# Patient Record
Sex: MALE | Race: WHITE | NOT HISPANIC OR LATINO | Employment: FULL TIME | URBAN - NONMETROPOLITAN AREA
[De-identification: names, ages, dates, MRNs, and addresses within clinical notes are randomized per-mention and may not be internally consistent; named-entity substitution may affect disease eponyms.]

---

## 2017-01-20 ENCOUNTER — TELEPHONE (OUTPATIENT)
Dept: FAMILY MEDICINE CLINIC | Facility: CLINIC | Age: 27
End: 2017-01-20

## 2017-01-20 DIAGNOSIS — F31.9 BIPOLAR 1 DISORDER, DEPRESSED (HCC): ICD-10-CM

## 2017-01-20 RX ORDER — GABAPENTIN 100 MG/1
100 CAPSULE ORAL 3 TIMES DAILY
Qty: 270 CAPSULE | Refills: 1 | Status: SHIPPED | OUTPATIENT
Start: 2017-01-20 | End: 2017-01-31 | Stop reason: SDUPTHER

## 2017-01-20 NOTE — TELEPHONE ENCOUNTER
----- Message from Ida Garcias sent at 1/19/2017 11:30 AM CST -----  Regarding: SCRIPT REQUEST  Contact: [phone]  CHANCE NOONAN/[facility] [place] REQUESTING SCRIPT FOR GABAPENTIN. INSURANCE IS REQUESTING A 90 DAY SUPPLY

## 2017-01-25 DIAGNOSIS — F31.9 BIPOLAR 1 DISORDER, DEPRESSED (HCC): ICD-10-CM

## 2017-01-26 ENCOUNTER — OFFICE VISIT (OUTPATIENT)
Dept: FAMILY MEDICINE CLINIC | Facility: CLINIC | Age: 27
End: 2017-01-26

## 2017-01-26 ENCOUNTER — TELEPHONE (OUTPATIENT)
Dept: FAMILY MEDICINE CLINIC | Facility: CLINIC | Age: 27
End: 2017-01-26

## 2017-01-26 VITALS
BODY MASS INDEX: 34.4 KG/M2 | DIASTOLIC BLOOD PRESSURE: 66 MMHG | HEART RATE: 114 BPM | HEIGHT: 71 IN | OXYGEN SATURATION: 98 % | WEIGHT: 245.7 LBS | SYSTOLIC BLOOD PRESSURE: 136 MMHG

## 2017-01-26 DIAGNOSIS — F31.76 BIPOLAR DISORDER, IN FULL REMISSION, MOST RECENT EPISODE DEPRESSED (HCC): Primary | ICD-10-CM

## 2017-01-26 PROCEDURE — 99213 OFFICE O/P EST LOW 20 MIN: CPT | Performed by: FAMILY MEDICINE

## 2017-01-26 RX ORDER — OLANZAPINE 15 MG/1
15 TABLET ORAL
COMMUNITY
Start: 2016-10-18 | End: 2017-06-26

## 2017-01-26 RX ORDER — GABAPENTIN 100 MG/1
CAPSULE ORAL
COMMUNITY
Start: 2016-10-29 | End: 2017-06-26

## 2017-01-26 NOTE — TELEPHONE ENCOUNTER
----- Message from Teressa Rankin sent at 1/26/2017 12:28 PM CST -----  Contact: PT IS AT OFFICE  PT IS HAVING A NEGATIVE REACTION TO MEDS AND WOULD LIKE TO KNOW IF HE CAN SEE YOU TODAY?    Patient has an appointment with Dr. marina this afternoon.          This document has been electronically signed by Aparna Knox MD on January 26, 2017 1:16 PM

## 2017-01-26 NOTE — PROGRESS NOTES
I have reviewed the notes, assessments, and/or procedures performed. I concur with her/his documentation of Ravi Reid.     Edgard Perkins, DO

## 2017-01-26 NOTE — PROGRESS NOTES
Subjective:     Ravi Reid is a 26 y.o. male who presents for follow up for depression and bipolar disorder.     Preventative:  Over the past 2 weeks, have you felt down, depressed, or hopeless?Yes   Over the past 2 weeks, have you felt little interest or pleasure in doing things?Yes  Clinical depression screening refused by patient.No     On osteoporosis therapy?No     Past Medical Hx:  Past Medical History   Diagnosis Date   • Acquired hypothyroidism    • Allergic rhinitis    • Bilateral conjunctivitis    • BOM (bilateral otitis media)    • General medical examination      General examination of patient - college physical      • Hypertensive disorder    • Insect bite      Insect bite - wound - History of possible spider bite.      • Kidney stones, calcium oxalate    • Upper respiratory infection        Past Surgical Hx:  Past Surgical History   Procedure Laterality Date   • Cystoscopy  11/14/2014      Right retrograde, ureteroscpoy, laser lithotripsy and J stent placement.        Health Maintenance:  Health Maintenance   Topic Date Due   • INFLUENZA VACCINE  08/01/2016   • TDAP/TD VACCINES (3 - Td) 05/11/2020   • PNEUMOCOCCAL VACCINE (19-64 MEDIUM RISK)  Addressed       Current Meds:    Current Outpatient Prescriptions:   •  buPROPion XL (WELLBUTRIN XL) 150 MG 24 hr tablet, Take 1 tablet by mouth Daily., Disp: 90 tablet, Rfl: 0  •  gabapentin (NEURONTIN) 100 MG capsule, TAKE 1 CAPSULE BY MOUTH 3 (THREE) TIMES A DAY., Disp: 120 capsule, Rfl: 1  •  gabapentin (NEURONTIN) 100 MG capsule, Take 1 capsule by mouth 3 (Three) Times a Day for 90 days., Disp: 270 capsule, Rfl: 1  •  gabapentin (NEURONTIN) 100 MG capsule, TAKE 1 CAPSULE BY MOUTH 3 (THREE) TIMES A DAY., Disp: , Rfl:   •  OLANZapine (ZYPREXA) 15 MG tablet, Take 1 tablet by mouth Every Night., Disp: 90 tablet, Rfl: 1  •  OLANZapine (ZYPREXA) 15 MG tablet, Take 15 mg by mouth., Disp: , Rfl:     Allergies:  Penicillins    Family Hx:  No family  "history on file.     Social History:  Social History     Social History   • Marital status: Single     Spouse name: N/A   • Number of children: N/A   • Years of education: N/A     Occupational History   • Not on file.     Social History Main Topics   • Smoking status: Current Some Day Smoker     Types: Electronic Cigarette   • Smokeless tobacco: Never Used   • Alcohol use 2.4 oz/week     2 Cans of beer, 2 Shots of liquor per week   • Drug use: No   • Sexual activity: Yes     Birth control/ protection: Condom     Other Topics Concern   • Not on file     Social History Narrative       Review of Systems  General:negative for - chills, fatigue, fever, hot flashes, malaise, night sweats, weight gain or weight loss  Psychological: Positive for depression   Ophthalmic: negative for - blurry vision or loss of vision  ENT: negative for - hearing change, nasal congestion or sore throat  Hematological and Lymphatic: negative for - jaundice  Endocrine: negative for - hair pattern changes, skin changes or temperature intolerance  Respiratory: no cough, shortness of breath, or wheezing  Cardiovascular: no chest pain, edema or dyspnea on exertion  Gastrointestinal: no  Nausea/vomiting, abdominal pain, change in bowel habits, or black or bloody stools  Genito-Urinary: no dysuria, trouble voiding, or hematuria  Musculoskeletal: negative for - joint pain or muscle pain  Neurological: negative for - dizziness, headaches, numbness/tingling or seizures  Dermatological: negative for rash and skin lesion changes      Objective:     Visit Vitals   • /66 (BP Location: Left arm, Patient Position: Sitting, Cuff Size: Adult)   • Pulse 114   • Ht 71\" (180.3 cm)   • Wt 245 lb 11.2 oz (111 kg)   • SpO2 98%   • BMI 34.27 kg/m2           General Appearance:    Alert, cooperative, no distress, appears stated age   Head:    Normocephalic, without obvious abnormality, atraumatic   Eyes:    PERRL, conjunctiva/corneas clear, EOM's intact   Ears:   "  Normal  external ear canals, both ears   Nose:   Nares normal, septum midline, mucosa normal, no drainage       Throat:   Lips, mucosa, and tongue normal; teeth and gums normal   Neck:   Supple, symmetrical, trachea midline   Back:     Symmetric, no curvature, ROM normal   Lungs:     Clear to auscultation bilaterally, respirations unlabored   Chest Wall:    No tenderness or deformity    Heart:    Regular rate and rhythm, S1 and S2 normal, no murmur, rub    or gallop   Abdomen:     Soft, non-tender, bowel sounds active all four quadrants,       Extremities:   Extremities normal, atraumatic, no cyanosis or edema       Skin:   Skin color, texture, turgor normal, no rashes or lesions       Neurologic:   CNII-XII grossly intact              Assessment/Plan:     1. Bipolar disorder, in full remission, most recent episode depressed         I have told patient to reduce dose of Zyprexa from 15 mg daily to 7.5 mg daily; if he has any thoughts of harming or killing himself, he needs to go to ER. Pt voices understanding.   Follow-up:     No Follow-up on file.              This document has been electronically signed by Stu Thomas MD on January 26, 2017 3:13 PM

## 2017-01-26 NOTE — MR AVS SNAPSHOT
Ravi Reid   1/26/2017 2:45 PM   Office Visit    Dept Phone:  [phone]   Encounter #:  [id number]    Provider:  Stu Thomas MD   Department:  [facility] FAMILY MEDICINE                Your Full Care Plan              Your Updated Medication List          This list is accurate as of: 1/26/17  3:07 PM.  Always use your most recent med list.                buPROPion  MG 24 hr tablet   Commonly known as:  WELLBUTRIN XL   Take 1 tablet by mouth Daily.       * gabapentin 100 MG capsule   Commonly known as:  NEURONTIN       * gabapentin 100 MG capsule   Commonly known as:  NEURONTIN   TAKE 1 CAPSULE BY MOUTH 3 (THREE) TIMES A DAY.       * gabapentin 100 MG capsule   Commonly known as:  NEURONTIN   Take 1 capsule by mouth 3 (Three) Times a Day for 90 days.       * ZYPREXA 15 MG tablet   Generic drug:  OLANZapine       * OLANZapine 15 MG tablet   Commonly known as:  ZYPREXA   Take 1 tablet by mouth Every Night.       * Notice:  This list has 5 medication(s) that are the same as other medications prescribed for you. Read the directions carefully, and ask your doctor or other care provider to review them with you.            Instructions     None    Patient Instructions History      Upcoming Appointments     Visit Type Date Time Department    OFFICE VISIT 1/26/2017  2:45 PM [facility] RESIDENT [facility]    OFFICE VISIT 1/31/2017  3:45 PM [facility] RESIDENT [facility] Signup     Our records indicate that you have an active [employer]" account.    You can view your After Visit Summary by going to [employer] and logging in with your [employer] username and password.  If you don't have a [employer] username and password but a parent or guardian has access to your record, the parent or guardian should login with their own [employer] username and password and access your record to view the After Visit Summary.    If you have questions, you can email  [email or web address] or call [phone] to talk to our MyChart staff.  Remember, MyChart is NOT to be used for urgent needs.  For medical emergencies, dial 911.               Other Info from Your Visit           Your Appointments     Jan 31, 2017  3:45 PM CST   Office Visit with Aparna Knox MD   [facility] FAMILY MEDICINE (--)    [address] [place] [address] [phone]           Arrive 15 minutes prior to appointment.              Other Notes About Your Plan     Risk score 4        Allergies     Penicillins  Rash      Reason for Visit     Anxiety     Depression           Vital Signs     Blood Pressure Pulse Height Weight Oxygen Saturation Body Mass Index    136/66 (BP Location: Left arm, Patient Position: Sitting, Cuff Size: Adult) 114 71\" (180.3 cm) 245 lb 11.2 oz (111 kg) 98% 34.27 kg/m2    Smoking Status                   Current Some Day Smoker             "

## 2017-01-27 RX ORDER — GABAPENTIN 100 MG/1
CAPSULE ORAL
Qty: 120 CAPSULE | Refills: 0 | OUTPATIENT
Start: 2017-01-27

## 2017-01-31 ENCOUNTER — OFFICE VISIT (OUTPATIENT)
Dept: FAMILY MEDICINE CLINIC | Facility: CLINIC | Age: 27
End: 2017-01-31

## 2017-01-31 VITALS
WEIGHT: 251.6 LBS | OXYGEN SATURATION: 99 % | BODY MASS INDEX: 35.22 KG/M2 | DIASTOLIC BLOOD PRESSURE: 88 MMHG | HEART RATE: 109 BPM | HEIGHT: 71 IN | SYSTOLIC BLOOD PRESSURE: 140 MMHG

## 2017-01-31 DIAGNOSIS — F31.32 BIPOLAR 1 DISORDER, DEPRESSED, MODERATE (HCC): Primary | ICD-10-CM

## 2017-01-31 PROCEDURE — 99213 OFFICE O/P EST LOW 20 MIN: CPT | Performed by: FAMILY MEDICINE

## 2017-01-31 RX ORDER — LURASIDONE HYDROCHLORIDE 20 MG/1
20 TABLET, FILM COATED ORAL DAILY
Qty: 30 TABLET | Refills: 3 | Status: SHIPPED | OUTPATIENT
Start: 2017-01-31 | End: 2017-03-28 | Stop reason: SDUPTHER

## 2017-03-28 ENCOUNTER — TELEPHONE (OUTPATIENT)
Dept: FAMILY MEDICINE CLINIC | Facility: CLINIC | Age: 27
End: 2017-03-28

## 2017-03-28 DIAGNOSIS — F31.32 BIPOLAR 1 DISORDER, DEPRESSED, MODERATE (HCC): ICD-10-CM

## 2017-03-28 RX ORDER — LURASIDONE HYDROCHLORIDE 20 MG/1
20 TABLET, FILM COATED ORAL DAILY
Qty: 90 TABLET | Refills: 0 | Status: SHIPPED | OUTPATIENT
Start: 2017-03-28 | End: 2017-06-26 | Stop reason: SDUPTHER

## 2017-03-28 NOTE — TELEPHONE ENCOUNTER
Refill for latuda was sent in to [facility] pharmacy.    Aparna Knox M.D.  Family Medicine Resident, PGY II        This document has been electronically signed by Aparna Knox MD on March 28, 2017 10:22 AM

## 2017-06-26 ENCOUNTER — OFFICE VISIT (OUTPATIENT)
Dept: FAMILY MEDICINE CLINIC | Facility: CLINIC | Age: 27
End: 2017-06-26

## 2017-06-26 ENCOUNTER — APPOINTMENT (OUTPATIENT)
Dept: LAB | Facility: HOSPITAL | Age: 27
End: 2017-06-26

## 2017-06-26 VITALS
DIASTOLIC BLOOD PRESSURE: 84 MMHG | BODY MASS INDEX: 35.16 KG/M2 | HEART RATE: 76 BPM | HEIGHT: 71 IN | SYSTOLIC BLOOD PRESSURE: 136 MMHG | WEIGHT: 251.13 LBS | OXYGEN SATURATION: 98 %

## 2017-06-26 DIAGNOSIS — E66.09 OBESITY DUE TO EXCESS CALORIES, UNSPECIFIED OBESITY SEVERITY: ICD-10-CM

## 2017-06-26 DIAGNOSIS — Z13.1 SCREENING FOR DIABETES MELLITUS: ICD-10-CM

## 2017-06-26 DIAGNOSIS — F31.32 BIPOLAR 1 DISORDER, DEPRESSED, MODERATE (HCC): Primary | ICD-10-CM

## 2017-06-26 LAB
ALBUMIN SERPL-MCNC: 4.8 G/DL (ref 3.4–4.8)
ALBUMIN/GLOB SERPL: 1.6 G/DL (ref 1.1–1.8)
ALP SERPL-CCNC: 78 U/L (ref 38–126)
ALT SERPL W P-5'-P-CCNC: 85 U/L (ref 21–72)
ANION GAP SERPL CALCULATED.3IONS-SCNC: 13 MMOL/L (ref 5–15)
AST SERPL-CCNC: 53 U/L (ref 17–59)
BASOPHILS # BLD AUTO: 0.06 10*3/MM3 (ref 0–0.2)
BASOPHILS NFR BLD AUTO: 0.6 % (ref 0–2)
BILIRUB SERPL-MCNC: 0.8 MG/DL (ref 0.2–1.3)
BUN BLD-MCNC: 11 MG/DL (ref 7–21)
BUN/CREAT SERPL: 11.1 (ref 7–25)
CALCIUM SPEC-SCNC: 9 MG/DL (ref 8.4–10.2)
CHLORIDE SERPL-SCNC: 101 MMOL/L (ref 95–110)
CO2 SERPL-SCNC: 26 MMOL/L (ref 22–31)
CREAT BLD-MCNC: 0.99 MG/DL (ref 0.7–1.3)
DEPRECATED RDW RBC AUTO: 36.5 FL (ref 35.1–43.9)
EOSINOPHIL # BLD AUTO: 0.5 10*3/MM3 (ref 0–0.7)
EOSINOPHIL NFR BLD AUTO: 5.1 % (ref 0–7)
ERYTHROCYTE [DISTWIDTH] IN BLOOD BY AUTOMATED COUNT: 12.2 % (ref 11.5–14.5)
GFR SERPL CREATININE-BSD FRML MDRD: 92 ML/MIN/1.73 (ref 77–179)
GLOBULIN UR ELPH-MCNC: 3 GM/DL (ref 2.3–3.5)
GLUCOSE BLD-MCNC: 89 MG/DL (ref 60–100)
HBA1C MFR BLD: 5.47 % (ref 4–5.6)
HCT VFR BLD AUTO: 44.4 % (ref 39–49)
HGB BLD-MCNC: 15.6 G/DL (ref 13.7–17.3)
IMM GRANULOCYTES # BLD: 0.02 10*3/MM3 (ref 0–0.02)
IMM GRANULOCYTES NFR BLD: 0.2 % (ref 0–0.5)
LYMPHOCYTES # BLD AUTO: 2.92 10*3/MM3 (ref 0.6–4.2)
LYMPHOCYTES NFR BLD AUTO: 29.9 % (ref 10–50)
MCH RBC QN AUTO: 29.2 PG (ref 26.5–34)
MCHC RBC AUTO-ENTMCNC: 35.1 G/DL (ref 31.5–36.3)
MCV RBC AUTO: 83 FL (ref 80–98)
MONOCYTES # BLD AUTO: 0.8 10*3/MM3 (ref 0–0.9)
MONOCYTES NFR BLD AUTO: 8.2 % (ref 0–12)
NEUTROPHILS # BLD AUTO: 5.46 10*3/MM3 (ref 2–8.6)
NEUTROPHILS NFR BLD AUTO: 56 % (ref 37–80)
PLATELET # BLD AUTO: 260 10*3/MM3 (ref 150–450)
PMV BLD AUTO: 10.6 FL (ref 8–12)
POTASSIUM BLD-SCNC: 4.1 MMOL/L (ref 3.5–5.1)
PROT SERPL-MCNC: 7.8 G/DL (ref 6.3–8.6)
RBC # BLD AUTO: 5.35 10*6/MM3 (ref 4.37–5.74)
SODIUM BLD-SCNC: 140 MMOL/L (ref 137–145)
TSH SERPL DL<=0.05 MIU/L-ACNC: 7.32 MIU/ML (ref 0.46–4.68)
WBC NRBC COR # BLD: 9.76 10*3/MM3 (ref 3.2–9.8)

## 2017-06-26 PROCEDURE — 83525 ASSAY OF INSULIN: CPT | Performed by: FAMILY MEDICINE

## 2017-06-26 PROCEDURE — 80053 COMPREHEN METABOLIC PANEL: CPT | Performed by: FAMILY MEDICINE

## 2017-06-26 PROCEDURE — 84443 ASSAY THYROID STIM HORMONE: CPT | Performed by: FAMILY MEDICINE

## 2017-06-26 PROCEDURE — 84439 ASSAY OF FREE THYROXINE: CPT | Performed by: FAMILY MEDICINE

## 2017-06-26 PROCEDURE — 85025 COMPLETE CBC W/AUTO DIFF WBC: CPT | Performed by: FAMILY MEDICINE

## 2017-06-26 PROCEDURE — 83036 HEMOGLOBIN GLYCOSYLATED A1C: CPT | Performed by: FAMILY MEDICINE

## 2017-06-26 PROCEDURE — 99213 OFFICE O/P EST LOW 20 MIN: CPT | Performed by: FAMILY MEDICINE

## 2017-06-26 PROCEDURE — 36415 COLL VENOUS BLD VENIPUNCTURE: CPT | Performed by: FAMILY MEDICINE

## 2017-06-26 RX ORDER — GABAPENTIN 300 MG/1
300 CAPSULE ORAL 3 TIMES DAILY
Qty: 90 CAPSULE | Refills: 5 | Status: SHIPPED | OUTPATIENT
Start: 2017-06-26 | End: 2017-12-22

## 2017-06-26 RX ORDER — LURASIDONE HYDROCHLORIDE 20 MG/1
20 TABLET, FILM COATED ORAL DAILY
Qty: 30 TABLET | Refills: 5 | Status: SHIPPED | OUTPATIENT
Start: 2017-06-26 | End: 2017-12-21 | Stop reason: SDUPTHER

## 2017-06-27 LAB — T4 FREE SERPL-MCNC: 1.11 NG/DL (ref 0.78–2.19)

## 2017-06-29 LAB — INSULIN SERPL-ACNC: 6.4 UIU/ML

## 2017-06-30 ENCOUNTER — TELEPHONE (OUTPATIENT)
Dept: FAMILY MEDICINE CLINIC | Facility: CLINIC | Age: 27
End: 2017-06-30

## 2017-07-03 NOTE — TELEPHONE ENCOUNTER
Spoke with Insurance company. They stated it was a deductible issue. Latuda is covered, however, patient hasn't met his deductible. They stated he may call them directly for an appeal. Called patient and left message.     Signature   Aparna Knox M.D.  Family Medicine Resident, PGY II  [address] [place], [place] [id number] [phone]          This document has been electronically signed by Aparna Knox MD on July 3, 2017 9:12 AM

## 2017-07-05 ENCOUNTER — TELEPHONE (OUTPATIENT)
Dept: FAMILY MEDICINE CLINIC | Facility: CLINIC | Age: 27
End: 2017-07-05

## 2017-07-06 ENCOUNTER — TELEPHONE (OUTPATIENT)
Dept: FAMILY MEDICINE CLINIC | Facility: CLINIC | Age: 27
End: 2017-07-06

## 2017-07-06 NOTE — TELEPHONE ENCOUNTER
Medication has been approved for today through 7/6/2019  Authorization: [id number]  Called [facility] pharmacy and patient to inform them of the approval and to fill medication    Signature   Aparna Knox M.D.  Family Medicine Resident, PGY III  [address] [place], [place] [id number] [phone]          This document has been electronically signed by Aparna Knox MD on July 6, 2017 5:22 PM

## 2017-07-17 ENCOUNTER — TELEPHONE (OUTPATIENT)
Dept: FAMILY MEDICINE CLINIC | Facility: CLINIC | Age: 27
End: 2017-07-17

## 2017-07-17 DIAGNOSIS — E03.9 ACQUIRED HYPOTHYROIDISM: Primary | ICD-10-CM

## 2017-07-17 RX ORDER — LEVOTHYROXINE SODIUM 0.03 MG/1
25 TABLET ORAL DAILY
Qty: 30 TABLET | Refills: 2 | Status: SHIPPED | OUTPATIENT
Start: 2017-07-17 | End: 2019-02-13

## 2017-07-17 NOTE — TELEPHONE ENCOUNTER
----- Message from Gabrielle Santa sent at 7/17/2017 11:22 AM CDT -----  PT CALLED, SAID THAT THERE WAS SUPPOSED TO BE A MED CALLED OVER TO HIS PHARMACY.    SAID THAT IT WAS SUPPOSED TO BE SYNTHROID.    PT USES [facility].     CAN REACH PT [phone]

## 2017-07-17 NOTE — TELEPHONE ENCOUNTER
Discussed with patient prior. Patient needs a low dose synthroid. Will continue to monitor. Discussed side effects and proper ways to take medication (30 min prior to eating or drinking anything other then water). Patient understands.     Signature   Aparna Knox M.D.  Family Medicine Resident, PGY III  [address] [place], [place] [id number] [phone]          This document has been electronically signed by Aparna Knox MD on July 17, 2017 1:25 PM

## 2017-07-21 DIAGNOSIS — F31.9 BIPOLAR 1 DISORDER, DEPRESSED (HCC): ICD-10-CM

## 2017-08-10 NOTE — TELEPHONE ENCOUNTER
CALLED THIS PT AND ADVISED HIM HE NEEDS AN APPT TO GET THIS SCRIPT FILLED, HE SAID HE'D CHECK HIS WORK SCHEDULE AND CALL US BACK.

## 2017-08-11 RX ORDER — GABAPENTIN 100 MG/1
CAPSULE ORAL
Qty: 270 CAPSULE | OUTPATIENT
Start: 2017-08-11

## 2017-08-11 NOTE — TELEPHONE ENCOUNTER
Spoke with the patient at last visit about gabapentin becoming a controlled substance. He understood. [facility]     Signature   Aparna Knox M.D.  Family Medicine Resident, PGY III  [address] [place], [place] [id number] [phone]          This document has been electronically signed by Aparna Knox MD on August 11, 2017 8:50 AM

## 2017-12-21 DIAGNOSIS — F31.32 BIPOLAR 1 DISORDER, DEPRESSED, MODERATE (HCC): ICD-10-CM

## 2017-12-21 RX ORDER — LURASIDONE HYDROCHLORIDE 20 MG/1
TABLET, FILM COATED ORAL
Qty: 30 TABLET | Refills: 5 | Status: SHIPPED | OUTPATIENT
Start: 2017-12-21 | End: 2017-12-22

## 2017-12-22 ENCOUNTER — OFFICE VISIT (OUTPATIENT)
Dept: FAMILY MEDICINE CLINIC | Facility: CLINIC | Age: 27
End: 2017-12-22

## 2017-12-22 VITALS
HEIGHT: 71 IN | BODY MASS INDEX: 35.21 KG/M2 | OXYGEN SATURATION: 96 % | WEIGHT: 251.5 LBS | DIASTOLIC BLOOD PRESSURE: 70 MMHG | HEART RATE: 92 BPM | SYSTOLIC BLOOD PRESSURE: 136 MMHG

## 2017-12-22 DIAGNOSIS — G43.109 MIGRAINE WITH AURA AND WITHOUT STATUS MIGRAINOSUS, NOT INTRACTABLE: ICD-10-CM

## 2017-12-22 DIAGNOSIS — F31.9 BIPOLAR 1 DISORDER (HCC): Primary | ICD-10-CM

## 2017-12-22 DIAGNOSIS — F41.9 ANXIETY: ICD-10-CM

## 2017-12-22 PROCEDURE — 99213 OFFICE O/P EST LOW 20 MIN: CPT | Performed by: FAMILY MEDICINE

## 2017-12-22 RX ORDER — BUSPIRONE HYDROCHLORIDE 15 MG/1
15 TABLET ORAL 2 TIMES DAILY
Qty: 60 TABLET | Refills: 3 | Status: SHIPPED | OUTPATIENT
Start: 2017-12-22 | End: 2018-08-24 | Stop reason: HOSPADM

## 2017-12-22 RX ORDER — SUMATRIPTAN 50 MG/1
TABLET, FILM COATED ORAL
Qty: 10 TABLET | Refills: 3 | Status: SHIPPED | OUTPATIENT
Start: 2017-12-22 | End: 2019-05-14 | Stop reason: SDUPTHER

## 2017-12-22 RX ORDER — LURASIDONE HYDROCHLORIDE 40 MG/1
40 TABLET, FILM COATED ORAL DAILY
Qty: 30 TABLET | Refills: 5 | Status: SHIPPED | OUTPATIENT
Start: 2017-12-22 | End: 2018-08-24 | Stop reason: HOSPADM

## 2017-12-22 NOTE — PROGRESS NOTES
I have reviewed the notes, assessments, and/or procedures performed by Dr. Knox, I concur with her/his documentation of Ravi Reid.         This document has been electronically signed by Bertha Helms MD on December 22, 2017 10:55 AM

## 2017-12-22 NOTE — PROGRESS NOTES
Subjective:     Ravi Reid is a 27 y.o. male who presents for follow up for Bipolar type 1  Depression HPI:  Ravi Reid is a 27 y.o. male who presents for Bipolar 1 depression.      Depression:   Depression symptoms were gradual in onset. Onset was approximately 3 months ago. gradually worsening since that time.  Concerns/Stressors:Was improved on Latuda 20 mg. Now with time change and getting dark early, patient has been getting more depressed    Depression symptoms:    Feeling depressed, Anhedonia, Altered appetite, Sleep disturbance, Lack of energy and Feeling withdrawn  Alarm symptoms:   History of manic episode  Manic symptoms:   Periods of abnormally elevated mood, Increased activity, Decreased need for sleep, Rapid speech, Racing thoughts, Easily distracted, Periods of increased spending of risk in which patient sold his car and bought one he could not afford  Associated symptoms:   Feeling anxious and Feeling irritable  Comorbid Conditions:   Positive for Bipolar type 1    He has no current suicidal and homicidal plan or intent.    Treatments:  Patient did well with latuda 20 mg daily. He did not notice a difference with Gabapentin. Has tried SSRIs in the past that sent him     PHQ 9 SCORE: 18    Total Score Depression Severity   1-4 Minimal depression   5-9 Mild depression   10-14 Moderate depression   15-19 Moderately severe depression   20-27 Severe depression         Headache  Patient presents for evaluation of headache. Symptoms began about 3 months ago. Generally, the headaches last about 1 day and occur several times per week. The headaches ussually better with Time and excedrin. The headaches are usually throbbing and are located in the temples and up to the top of his head.  The patient rates his most severe headaches a 10 on a scale from 1 to 10. Recently, the headaches have been increasing in frequency. Work attendance or other daily activities are affected by the  headaches. Precipitating factors include: none which have been determined. The headaches are usually preceded by an aura consisting of blurry vision. Associated neurologic symptoms: speech difficulties and vision problems. The patient denies decreased physical activity, loss of balance, muscle weakness and vomiting in the early morning. Home treatment has included ibuprofen and excedrin with little improvement. Other history includes: nothing pertinent. Family history includes migraine headaches in mother.      Preventative:  Over the past 2 weeks, have you felt down, depressed, or hopeless?Yes   Over the past 2 weeks, have you felt little interest or pleasure in doing things?Yes  Clinical depression screening refused by patient.No     On osteoporosis therapy?Not Indicated     Past Medical Hx:  Past Medical History:   Diagnosis Date   • Acquired hypothyroidism    • Allergic rhinitis    • Bilateral conjunctivitis    • BOM (bilateral otitis media)    • General medical examination     General examination of patient - college physical      • Hypertensive disorder    • Insect bite     Insect bite - wound - History of possible spider bite.      • Kidney stones, calcium oxalate    • Upper respiratory infection        Past Surgical Hx:  Past Surgical History:   Procedure Laterality Date   • CYSTOSCOPY  11/14/2014     Right retrograde, ureteroscpoy, laser lithotripsy and J stent placement.        Health Maintenance:  Health Maintenance   Topic Date Due   • INFLUENZA VACCINE  08/01/2017   • TDAP/TD VACCINES (3 - Td) 05/11/2020   • PNEUMOCOCCAL VACCINE (19-64 MEDIUM RISK)  Addressed       Current Meds:    Current Outpatient Prescriptions:   •  levothyroxine (SYNTHROID, LEVOTHROID) 25 MCG tablet, Take 1 tablet by mouth Daily., Disp: 30 tablet, Rfl: 2  •  busPIRone (BUSPAR) 15 MG tablet, Take 1 tablet by mouth 2 (Two) Times a Day., Disp: 60 tablet, Rfl: 3  •  lurasidone (LATUDA) 40 MG tablet tablet, Take 1 tablet by mouth  "Daily., Disp: 30 tablet, Rfl: 5  •  SUMAtriptan (IMITREX) 50 MG tablet, Take one tablet at onset of headache. May repeat dose one time in 2 hours if headache not relieved., Disp: 10 tablet, Rfl: 3    Allergies:  Penicillins    Family Hx:  No family history on file.     Social History:  Social History     Social History   • Marital status: Single     Spouse name: N/A   • Number of children: N/A   • Years of education: N/A     Occupational History   • Not on file.     Social History Main Topics   • Smoking status: Current Some Day Smoker     Types: Electronic Cigarette   • Smokeless tobacco: Never Used   • Alcohol use 2.4 oz/week     2 Cans of beer, 2 Shots of liquor per week   • Drug use: No   • Sexual activity: Yes     Birth control/ protection: Condom     Other Topics Concern   • Not on file     Social History Narrative       Review of Systems  General:negative for - chills, fatigue, fever, hot flashes, malaise, night sweats, weight gain or weight loss  Psychological: negative for - anxiety, depression, sleep disturbances or suicidal ideation  Ophthalmic: negative for - blurry vision or loss of vision  ENT: negative for - hearing change, nasal congestion or sore throat  Hematological and Lymphatic: negative for - jaundice  Endocrine: negative for - hair pattern changes, skin changes or temperature intolerance  Respiratory: no cough, shortness of breath, or wheezing  Cardiovascular: no chest pain, edema or dyspnea on exertion  Gastrointestinal: no  Nausea/vomiting, abdominal pain, change in bowel habits, or black or bloody stools  Genito-Urinary: no dysuria, trouble voiding, or hematuria  Musculoskeletal: negative for - joint pain or muscle pain  Neurological: negative for - dizziness, headaches, numbness/tingling or seizures  Dermatological: negative for rash and skin lesion changes      Objective:     /70 (BP Location: Left arm, Patient Position: Sitting, Cuff Size: Adult)  Pulse 92  Ht 180.3 cm (71\")  Wt " 114 kg (251 lb 8 oz)  SpO2 96%  BMI 35.08 kg/m2        General Appearance:    Alert, cooperative, no distress, appears stated age   Head:    Normocephalic, without obvious abnormality, atraumatic   Eyes:    PERRL, conjunctiva/corneas clear, EOM's intact   Ears:    Normal TM's and external ear canals, both ears   Nose:   Nares normal, septum midline, mucosa normal, no drainage     or sinus tenderness   Throat:   Lips, mucosa, and tongue normal; teeth and gums normal   Neck:   Supple, symmetrical, trachea midline, no adenopathy;     thyroid:  no enlargement/tenderness/nodules; no carotid    bruit   Back:     Symmetric, no curvature, ROM normal, no CVA tenderness   Lungs:     Clear to auscultation bilaterally, respirations unlabored   Chest Wall:    No tenderness or deformity    Heart:    Regular rate and rhythm, S1 and S2 normal, no murmur, rub    or gallop   Abdomen:     Soft, non-tender, bowel sounds active all four quadrants,     no masses, no organomegaly   Extremities:   Extremities normal, atraumatic, no cyanosis or edema   Pulses:   2+ and symmetric all extremities   Skin:   Skin color, texture, turgor normal, no rashes or lesions   Lymph nodes:   Cervical, supraclavicular, and axillary nodes normal   Neurologic:   CNII-XII grossly intact              Assessment/Plan:      Diagnosis Plan   1. Bipolar 1 disorder  lurasidone (LATUDA) 40 MG tablet tablet   2. Anxiety  busPIRone (BUSPAR) 15 MG tablet   3. Migraine with aura and without status migrainosus, not intractable  SUMAtriptan (IMITREX) 50 MG tablet          Follow-up:   3 month or sooner if needed       Goals        Patient Stated    • Other (pt-stated)            Feel better: Decrease depression  Barriers: Bipolar type 1.               Preventative:  Recommended pneumonia vaccine and influenza vaccine: Patient declined    Smoking cessation counseling was provided.  does not drink  eat more fruits and vegetables, decrease soda or juice intake, increase  water intake, increase physical activity, reduce screen time, reduce portion size, cut out extra servings, reduce fast food intake, family to eat at dinner table more often, keep TV off during meals, plan meals, eat breakfast and have 3 meals a day    RISK SCORE: 4      Signature   Aparna Knox M.D.  Family Medicine Resident, PGY III  [address] [place] [address]  [phone]          This document has been electronically signed by Aparna Knox MD on December 22, 2017 10:32 AM

## 2018-08-20 ENCOUNTER — HOSPITAL ENCOUNTER (EMERGENCY)
Facility: HOSPITAL | Age: 28
Discharge: PSYCHIATRIC OR SUBSTANCE USE FACILITY | End: 2018-08-21
Attending: EMERGENCY MEDICINE | Admitting: EMERGENCY MEDICINE

## 2018-08-20 DIAGNOSIS — R45.851 SUICIDAL IDEATION: Primary | ICD-10-CM

## 2018-08-20 LAB
AMPHET+METHAMPHET UR QL: NEGATIVE
ANION GAP SERPL CALCULATED.3IONS-SCNC: 16 MMOL/L (ref 5–15)
BARBITURATES UR QL SCN: NEGATIVE
BASOPHILS # BLD AUTO: 0.08 10*3/MM3 (ref 0–0.2)
BASOPHILS NFR BLD AUTO: 0.6 % (ref 0–2)
BENZODIAZ UR QL SCN: NEGATIVE
BILIRUB UR QL STRIP: NEGATIVE
BUN BLD-MCNC: 11 MG/DL (ref 7–21)
BUN/CREAT SERPL: 14.7 (ref 7–25)
CALCIUM SPEC-SCNC: 8.8 MG/DL (ref 8.4–10.2)
CANNABINOIDS SERPL QL: NEGATIVE
CHLORIDE SERPL-SCNC: 103 MMOL/L (ref 95–110)
CLARITY UR: CLEAR
CO2 SERPL-SCNC: 20 MMOL/L (ref 22–31)
COCAINE UR QL: NEGATIVE
COLOR UR: YELLOW
CREAT BLD-MCNC: 0.75 MG/DL (ref 0.7–1.3)
DEPRECATED RDW RBC AUTO: 37.9 FL (ref 35.1–43.9)
EOSINOPHIL # BLD AUTO: 0.86 10*3/MM3 (ref 0–0.7)
EOSINOPHIL NFR BLD AUTO: 6.5 % (ref 0–7)
ERYTHROCYTE [DISTWIDTH] IN BLOOD BY AUTOMATED COUNT: 12.6 % (ref 11.5–14.5)
ETHANOL BLD-MCNC: 140 MG/DL (ref 0–10)
ETHANOL UR QL: 0.14 %
GFR SERPL CREATININE-BSD FRML MDRD: 126 ML/MIN/1.73 (ref 77–179)
GLUCOSE BLD-MCNC: 136 MG/DL (ref 60–100)
GLUCOSE UR STRIP-MCNC: NEGATIVE MG/DL
HCT VFR BLD AUTO: 46.2 % (ref 39–49)
HGB BLD-MCNC: 16.7 G/DL (ref 13.7–17.3)
HGB UR QL STRIP.AUTO: NEGATIVE
HOLD SPECIMEN: NORMAL
IMM GRANULOCYTES # BLD: 0.11 10*3/MM3 (ref 0–0.02)
IMM GRANULOCYTES NFR BLD: 0.8 % (ref 0–0.5)
KETONES UR QL STRIP: NEGATIVE
LEUKOCYTE ESTERASE UR QL STRIP.AUTO: NEGATIVE
LYMPHOCYTES # BLD AUTO: 4.23 10*3/MM3 (ref 0.6–4.2)
LYMPHOCYTES NFR BLD AUTO: 31.8 % (ref 10–50)
MCH RBC QN AUTO: 29.9 PG (ref 26.5–34)
MCHC RBC AUTO-ENTMCNC: 36.1 G/DL (ref 31.5–36.3)
MCV RBC AUTO: 82.8 FL (ref 80–98)
METHADONE UR QL SCN: NEGATIVE
MONOCYTES # BLD AUTO: 1.18 10*3/MM3 (ref 0–0.9)
MONOCYTES NFR BLD AUTO: 8.9 % (ref 0–12)
NEUTROPHILS # BLD AUTO: 6.86 10*3/MM3 (ref 2–8.6)
NEUTROPHILS NFR BLD AUTO: 51.4 % (ref 37–80)
NITRITE UR QL STRIP: NEGATIVE
OPIATES UR QL: NEGATIVE
OXYCODONE UR QL SCN: NEGATIVE
PH UR STRIP.AUTO: 6 [PH] (ref 5–9)
PLATELET # BLD AUTO: 262 10*3/MM3 (ref 150–450)
PMV BLD AUTO: 9.5 FL (ref 8–12)
POTASSIUM BLD-SCNC: 3.4 MMOL/L (ref 3.5–5.1)
PROT UR QL STRIP: NEGATIVE
RBC # BLD AUTO: 5.58 10*6/MM3 (ref 4.37–5.74)
SODIUM BLD-SCNC: 139 MMOL/L (ref 137–145)
SP GR UR STRIP: 1 (ref 1–1.03)
UROBILINOGEN UR QL STRIP: NORMAL
WBC NRBC COR # BLD: 13.32 10*3/MM3 (ref 3.2–9.8)
WHOLE BLOOD HOLD SPECIMEN: NORMAL
WHOLE BLOOD HOLD SPECIMEN: NORMAL

## 2018-08-20 PROCEDURE — 80307 DRUG TEST PRSMV CHEM ANLYZR: CPT | Performed by: EMERGENCY MEDICINE

## 2018-08-20 PROCEDURE — 81003 URINALYSIS AUTO W/O SCOPE: CPT | Performed by: EMERGENCY MEDICINE

## 2018-08-20 PROCEDURE — 80048 BASIC METABOLIC PNL TOTAL CA: CPT | Performed by: EMERGENCY MEDICINE

## 2018-08-20 PROCEDURE — 99284 EMERGENCY DEPT VISIT MOD MDM: CPT

## 2018-08-20 PROCEDURE — 85025 COMPLETE CBC W/AUTO DIFF WBC: CPT | Performed by: EMERGENCY MEDICINE

## 2018-08-20 RX ORDER — MIRTAZAPINE 30 MG/1
30 TABLET, FILM COATED ORAL NIGHTLY
COMMUNITY
End: 2019-05-14

## 2018-08-20 RX ORDER — LAMOTRIGINE 25 MG/1
25 TABLET ORAL DAILY
COMMUNITY
End: 2019-02-13

## 2018-08-20 RX ORDER — SERTRALINE HYDROCHLORIDE 100 MG/1
100 TABLET, FILM COATED ORAL DAILY
COMMUNITY
End: 2019-02-13

## 2018-08-20 RX ORDER — CLONAZEPAM 0.5 MG/1
0.5 TABLET ORAL 2 TIMES DAILY PRN
COMMUNITY
End: 2018-08-24 | Stop reason: HOSPADM

## 2018-08-21 ENCOUNTER — HOSPITAL ENCOUNTER (INPATIENT)
Facility: HOSPITAL | Age: 28
LOS: 3 days | Discharge: HOME | End: 2018-08-24
Attending: PSYCHIATRY & NEUROLOGY | Admitting: PSYCHIATRY & NEUROLOGY

## 2018-08-21 VITALS
RESPIRATION RATE: 18 BRPM | HEIGHT: 71 IN | WEIGHT: 275 LBS | SYSTOLIC BLOOD PRESSURE: 147 MMHG | DIASTOLIC BLOOD PRESSURE: 104 MMHG | BODY MASS INDEX: 38.5 KG/M2 | TEMPERATURE: 97.2 F | OXYGEN SATURATION: 94 % | HEART RATE: 100 BPM

## 2018-08-21 PROBLEM — F17.200 NICOTINE USE DISORDER: Status: ACTIVE | Noted: 2018-08-21

## 2018-08-21 PROBLEM — R45.851 SUICIDAL IDEATION: Status: ACTIVE | Noted: 2018-08-21

## 2018-08-21 PROBLEM — F10.10 ALCOHOL CONSUMPTION BINGE DRINKING: Status: ACTIVE | Noted: 2018-08-21

## 2018-08-21 PROBLEM — F31.76 BIPOLAR DISORDER, IN FULL REMISSION, MOST RECENT EPISODE DEPRESSED (HCC): Status: RESOLVED | Noted: 2017-01-26 | Resolved: 2018-08-21

## 2018-08-21 LAB
ALBUMIN SERPL-MCNC: 4.2 G/DL (ref 3.4–4.8)
ALBUMIN/GLOB SERPL: 1.3 G/DL (ref 1.1–1.8)
ALP SERPL-CCNC: 86 U/L (ref 38–126)
ALT SERPL W P-5'-P-CCNC: 171 U/L (ref 21–72)
ANION GAP SERPL CALCULATED.3IONS-SCNC: 13 MMOL/L (ref 5–15)
ARTICHOKE IGE QN: 137 MG/DL (ref 1–129)
AST SERPL-CCNC: 70 U/L (ref 17–59)
BILIRUB SERPL-MCNC: 0.6 MG/DL (ref 0.2–1.3)
BUN BLD-MCNC: 11 MG/DL (ref 7–21)
BUN/CREAT SERPL: 13.1 (ref 7–25)
CALCIUM SPEC-SCNC: 9 MG/DL (ref 8.4–10.2)
CHLORIDE SERPL-SCNC: 104 MMOL/L (ref 95–110)
CHOLEST SERPL-MCNC: 208 MG/DL (ref 0–199)
CO2 SERPL-SCNC: 23 MMOL/L (ref 22–31)
CREAT BLD-MCNC: 0.84 MG/DL (ref 0.7–1.3)
ETHANOL BLD-MCNC: 48 MG/DL (ref 0–10)
ETHANOL BLD-MCNC: 93 MG/DL (ref 0–10)
ETHANOL UR QL: 0.05 %
ETHANOL UR QL: 0.09 %
GFR SERPL CREATININE-BSD FRML MDRD: 110 ML/MIN/1.73 (ref 77–179)
GLOBULIN UR ELPH-MCNC: 3.2 GM/DL (ref 2.3–3.5)
GLUCOSE BLD-MCNC: 102 MG/DL (ref 60–100)
GLUCOSE P FAST SERPL-MCNC: 105 MG/DL (ref 60–110)
HDLC SERPL-MCNC: 38 MG/DL (ref 60–200)
LDLC/HDLC SERPL: 3.02 {RATIO} (ref 0–3.55)
POTASSIUM BLD-SCNC: 4.8 MMOL/L (ref 3.5–5.1)
PROT SERPL-MCNC: 7.4 G/DL (ref 6.3–8.6)
SODIUM BLD-SCNC: 140 MMOL/L (ref 137–145)
T4 FREE SERPL-MCNC: 1.08 NG/DL (ref 0.78–2.19)
TRIGL SERPL-MCNC: 277 MG/DL (ref 20–199)
TSH SERPL DL<=0.05 MIU/L-ACNC: 5.98 MIU/ML (ref 0.46–4.68)
WHOLE BLOOD HOLD SPECIMEN: NORMAL

## 2018-08-21 PROCEDURE — 93005 ELECTROCARDIOGRAM TRACING: CPT | Performed by: PSYCHIATRY & NEUROLOGY

## 2018-08-21 PROCEDURE — 90791 PSYCH DIAGNOSTIC EVALUATION: CPT | Performed by: PSYCHIATRY & NEUROLOGY

## 2018-08-21 PROCEDURE — 80061 LIPID PANEL: CPT | Performed by: PSYCHIATRY & NEUROLOGY

## 2018-08-21 PROCEDURE — 80307 DRUG TEST PRSMV CHEM ANLYZR: CPT | Performed by: EMERGENCY MEDICINE

## 2018-08-21 PROCEDURE — 82947 ASSAY GLUCOSE BLOOD QUANT: CPT | Performed by: PSYCHIATRY & NEUROLOGY

## 2018-08-21 PROCEDURE — 93010 ELECTROCARDIOGRAM REPORT: CPT | Performed by: INTERNAL MEDICINE

## 2018-08-21 PROCEDURE — 84443 ASSAY THYROID STIM HORMONE: CPT | Performed by: FAMILY MEDICINE

## 2018-08-21 PROCEDURE — 99232 SBSQ HOSP IP/OBS MODERATE 35: CPT | Performed by: FAMILY MEDICINE

## 2018-08-21 PROCEDURE — 80053 COMPREHEN METABOLIC PANEL: CPT | Performed by: FAMILY MEDICINE

## 2018-08-21 PROCEDURE — 84439 ASSAY OF FREE THYROXINE: CPT | Performed by: FAMILY MEDICINE

## 2018-08-21 RX ORDER — LORAZEPAM 2 MG/1
2 TABLET ORAL EVERY 6 HOURS PRN
Status: DISCONTINUED | OUTPATIENT
Start: 2018-08-21 | End: 2018-08-23

## 2018-08-21 RX ORDER — ONDANSETRON 4 MG/1
4 TABLET, ORALLY DISINTEGRATING ORAL EVERY 6 HOURS PRN
Status: DISCONTINUED | OUTPATIENT
Start: 2018-08-21 | End: 2018-08-24 | Stop reason: HOSPADM

## 2018-08-21 RX ORDER — BUSPIRONE HYDROCHLORIDE 15 MG/1
15 TABLET ORAL 2 TIMES DAILY
Status: DISCONTINUED | OUTPATIENT
Start: 2018-08-21 | End: 2018-08-21

## 2018-08-21 RX ORDER — CLONAZEPAM 0.5 MG/1
0.25 TABLET ORAL 2 TIMES DAILY PRN
Status: DISCONTINUED | OUTPATIENT
Start: 2018-08-21 | End: 2018-08-23

## 2018-08-21 RX ORDER — HYDROXYZINE PAMOATE 50 MG/1
50 CAPSULE ORAL EVERY 6 HOURS PRN
Status: DISCONTINUED | OUTPATIENT
Start: 2018-08-21 | End: 2018-08-24 | Stop reason: HOSPADM

## 2018-08-21 RX ORDER — SUMATRIPTAN 25 MG/1
25 TABLET, FILM COATED ORAL ONCE AS NEEDED
Status: DISCONTINUED | OUTPATIENT
Start: 2018-08-21 | End: 2018-08-24 | Stop reason: HOSPADM

## 2018-08-21 RX ORDER — CLONIDINE HYDROCHLORIDE 0.1 MG/1
0.1 TABLET ORAL EVERY 4 HOURS PRN
Status: DISCONTINUED | OUTPATIENT
Start: 2018-08-21 | End: 2018-08-24 | Stop reason: HOSPADM

## 2018-08-21 RX ORDER — LORAZEPAM 2 MG/1
2 TABLET ORAL
Status: DISCONTINUED | OUTPATIENT
Start: 2018-08-21 | End: 2018-08-23

## 2018-08-21 RX ORDER — QUETIAPINE FUMARATE 25 MG/1
12.5 TABLET, FILM COATED ORAL 2 TIMES DAILY PRN
Status: DISCONTINUED | OUTPATIENT
Start: 2018-08-21 | End: 2018-08-24 | Stop reason: HOSPADM

## 2018-08-21 RX ORDER — LORAZEPAM 2 MG/1
2 TABLET ORAL ONCE
Status: DISCONTINUED | OUTPATIENT
Start: 2018-08-21 | End: 2018-08-21

## 2018-08-21 RX ORDER — QUETIAPINE FUMARATE 100 MG/1
100 TABLET, FILM COATED ORAL NIGHTLY
Status: DISCONTINUED | OUTPATIENT
Start: 2018-08-21 | End: 2018-08-23

## 2018-08-21 RX ORDER — ALUMINA, MAGNESIA, AND SIMETHICONE 2400; 2400; 240 MG/30ML; MG/30ML; MG/30ML
15 SUSPENSION ORAL EVERY 6 HOURS PRN
Status: DISCONTINUED | OUTPATIENT
Start: 2018-08-21 | End: 2018-08-24 | Stop reason: HOSPADM

## 2018-08-21 RX ORDER — LOPERAMIDE HYDROCHLORIDE 2 MG/1
2 CAPSULE ORAL 4 TIMES DAILY PRN
Status: DISCONTINUED | OUTPATIENT
Start: 2018-08-21 | End: 2018-08-24 | Stop reason: HOSPADM

## 2018-08-21 RX ORDER — MIRTAZAPINE 15 MG/1
30 TABLET, FILM COATED ORAL NIGHTLY
Status: DISCONTINUED | OUTPATIENT
Start: 2018-08-21 | End: 2018-08-24 | Stop reason: HOSPADM

## 2018-08-21 RX ORDER — ACETAMINOPHEN 325 MG/1
650 TABLET ORAL EVERY 4 HOURS PRN
Status: DISCONTINUED | OUTPATIENT
Start: 2018-08-21 | End: 2018-08-24 | Stop reason: HOSPADM

## 2018-08-21 RX ORDER — LORAZEPAM 1 MG/1
1 TABLET ORAL
Status: DISCONTINUED | OUTPATIENT
Start: 2018-08-21 | End: 2018-08-23

## 2018-08-21 RX ORDER — LORAZEPAM 2 MG/ML
2 INJECTION INTRAMUSCULAR
Status: DISCONTINUED | OUTPATIENT
Start: 2018-08-21 | End: 2018-08-23

## 2018-08-21 RX ORDER — THIAMINE MONONITRATE (VIT B1) 100 MG
100 TABLET ORAL DAILY
Status: DISCONTINUED | OUTPATIENT
Start: 2018-08-21 | End: 2018-08-24 | Stop reason: HOSPADM

## 2018-08-21 RX ORDER — LEVOTHYROXINE SODIUM 0.03 MG/1
25 TABLET ORAL DAILY
Status: DISCONTINUED | OUTPATIENT
Start: 2018-08-21 | End: 2018-08-24 | Stop reason: HOSPADM

## 2018-08-21 RX ORDER — TRAZODONE HYDROCHLORIDE 50 MG/1
50 TABLET ORAL NIGHTLY PRN
Status: DISCONTINUED | OUTPATIENT
Start: 2018-08-21 | End: 2018-08-24 | Stop reason: HOSPADM

## 2018-08-21 RX ORDER — THIAMINE MONONITRATE (VIT B1) 100 MG
100 TABLET ORAL DAILY
Status: DISCONTINUED | OUTPATIENT
Start: 2018-08-21 | End: 2018-08-21 | Stop reason: HOSPADM

## 2018-08-21 RX ADMIN — SERTRALINE HYDROCHLORIDE 100 MG: 50 TABLET ORAL at 15:46

## 2018-08-21 RX ADMIN — QUETIAPINE 100 MG: 100 TABLET ORAL at 20:44

## 2018-08-21 RX ADMIN — MIRTAZAPINE 30 MG: 15 TABLET, FILM COATED ORAL at 20:44

## 2018-08-21 RX ADMIN — LEVOTHYROXINE SODIUM 25 MCG: 25 TABLET ORAL at 08:46

## 2018-08-21 RX ADMIN — BUSPIRONE HYDROCHLORIDE 15 MG: 15 TABLET ORAL at 08:46

## 2018-08-21 RX ADMIN — Medication 100 MG: at 15:46

## 2018-08-21 NOTE — PLAN OF CARE
Problem: Patient Care Overview  Goal: Plan of Care Review  Outcome: Ongoing (interventions implemented as appropriate)    Goal: Individualization and Mutuality  Outcome: Ongoing (interventions implemented as appropriate)    Goal: Discharge Needs Assessment  Outcome: Ongoing (interventions implemented as appropriate)    Goal: Interprofessional Rounds/Family Conf  Outcome: Ongoing (interventions implemented as appropriate)      Problem: Overarching Goals (Adult)  Goal: Adheres to Safety Considerations for Self and Others  Outcome: Ongoing (interventions implemented as appropriate)    Goal: Optimized Coping Skills in Response to Life Stressors  Outcome: Ongoing (interventions implemented as appropriate)    Goal: Develops/Participates in Therapeutic [place] to Support Successful Transition  Outcome: Ongoing (interventions implemented as appropriate)   08/21/18 0604   Overarching Goals (Adult)   Develops/Participates in Therapeutic [place] to Support Successful Transition making progress toward outcome       Problem: Suicidal Behavior (Adult)  Goal: Suicidal Behavior is Absent/Minimized/Managed  Outcome: Ongoing (interventions implemented as appropriate)   08/21/18 0604   Suicidal Behavior is Absent/Minimized/Managed   Suicidal Behavior Managed/Minimized Action Step (STG) Outcome making progress toward outcome

## 2018-08-21 NOTE — PLAN OF CARE
Problem: Patient Care Overview  Goal: Plan of Care Review  Outcome: Ongoing (interventions implemented as appropriate)    Goal: Individualization and Mutuality  Outcome: Ongoing (interventions implemented as appropriate)    Goal: Discharge Needs Assessment  Outcome: Ongoing (interventions implemented as appropriate)    Goal: Interprofessional Rounds/Family Conf  Outcome: Ongoing (interventions implemented as appropriate)      Problem: Overarching Goals (Adult)  Goal: Adheres to Safety Considerations for Self and Others  Outcome: Ongoing (interventions implemented as appropriate)   08/21/18 0558   Overarching Goals (Adult)   Adheres to Safety Considerations for Self and Others making progress toward outcome     Goal: Optimized Coping Skills in Response to Life Stressors  Outcome: Ongoing (interventions implemented as appropriate)   08/21/18 0558   Overarching Goals (Adult)   Optimized Coping Skills in Response to Life Stressors making progress toward outcome     Goal: Develops/Participates in Therapeutic [place] to Support Successful Transition  Outcome: Ongoing (interventions implemented as appropriate)   08/21/18 0558   Overarching Goals (Adult)   Develops/Participates in Therapeutic [place] to Support Successful Transition making progress toward outcome       Problem: Suicidal Behavior (Adult)  Goal: Suicidal Behavior is Absent/Minimized/Managed  Outcome: Ongoing (interventions implemented as appropriate)   08/21/18 0558   Suicidal Behavior is Absent/Minimized/Managed   Suicidal Behavior Managed/Minimized Action Step (STG) Outcome making progress toward outcome

## 2018-08-21 NOTE — NURSING NOTE
"Behavior     Anxiety: Patient denies at this time  Depression: depressed mood  Pain  0  AVH   no  S/I   no  H/I   no    Affect   mood-incongruent    Note: Patient has remained in his room for the majority of the morning. Patient has elevated mood, almost bouncing while speaking about recent events. Patient has appropriate eye contact. Clean, well kempt. Patient reports having had \"a lot of dark thoughts over the last several weeks\" and recently switched jobs and had invested around $400 into the new job. He states he realized this job was not for him and quit after a day and a half. He reports getting a promotion at this previous job as a result, which he is pleased about. Patient states, \"In the process all the thoughts jsut came to a head\" and eloisen went to ER.       Intervention    Instructed in medication usage and effects  Medications administered as ordered  Encouraged to verbalize needs      Response    Verbalized understanding   Did patient take medications as ordered yes  Did patient interact with assessment?  yes    Plan    Will monitor for safety  Will monitor every 15 minutes as ordered  Will evaluate and promote the plan of care    "

## 2018-08-21 NOTE — NURSING NOTE
Dr Miller ROS         General  Good general health lately    Eyes   glasses/contact lens    ENT/Mouth   None    Cardio   None    Resp   None    GI    None       None    MS    None    Skin/Hair/Nails   None    Neuro   None

## 2018-08-21 NOTE — NURSING NOTE
Patient arrived to [facility] accompanied by security. Patient wanded via security. no contraband found. Belongings checked. Patient oriented to unit.

## 2018-08-21 NOTE — NURSING NOTE
Pt evaluated by [facility]. Patient is actively suicidal with a plan to overdose on his prescription medication at home, or wreck his car. Patient currently wishes to be dead. Patient sees therapist at [facility]. Patient seen Thursday 8/16 with a medication change. Patient dx'd with Bipolar approximately four years ago. Patient is currently mood incongruent with affect appearing elated while having expressing thoughts of depression and anxiety. Patient has recent job change. Patient stated that he has recently been impulsive in decision making. Dr. Restrepo Notified. Patient to be admitted to [facility]. Dr. Jules notified.

## 2018-08-21 NOTE — H&P
8/21/2018    Source of History: chart review and the patient    Chief Complaint: suicidal ideation, depression and EtOH Use    History of Present Illness:    Mr. Reid is a 28-year-old gentleman with prior psychiatric history of bipolar spectrum disorder.  The patient was admitted very early this morning after presenting to the emergency room acutely intoxicated on alcohol and espousing suicidal ideation with voiced plan to consider overdose plain sleep and not wake up.  Patient was then admitted to the behavioral health unit for further inpatient stabilization and evaluation treatment.    Presents with suicidal ideation, anxiety and depression. Onset of symptoms was gradual starting several weeks ago.  Symptoms have been present on an increasingly more frequent basis. Symptoms are associated with anxiety, insomnia, depressed mood and substance use.  Symptoms are aggravated by educational problems and occupational problems.   Symptoms improve with none identified.  Patient's symptom severity is severe.   Patient reports that level of hopefulness is worsening.  Patient's symptoms occur in the context of job and financial stressors.    Patient reports that symptoms have been going on for several weeks since he had left his prior job as a  tried insurance for several days but did not like this job due to their use of intimidation and bully tactics and ultimately went back to his old job.  States he has been offered a promotion that he intends to take.  But states in the setting of these stressors as well as financial stress from the money that he spent on being able to be license for insurance on his mood has worsened.  He reports depression on a daily for at least the last 2 weeks.  Sleep is been poor with initial insomnia racing thoughts and condition arousal.  Anhedonia.  Ongoing guilt.  Low energy.  Concentration is fair.  Appetite is down with about 5 pounds of weight loss in last month or so.   Psychomotor slowing and restlessness reported.  Suicidal ideation as noted above, intermittent more intense over time.  Denies any prior suicide attempts.  Denies firearm access no family history of completed suicide or attempted suicide.  He does report a lot of high expectations placed from his parents but states that they're very supportive.  Reports this history consistent with him having internalized these expectations and reports that he is often overly critical of himself.  Becomes tearful when discussing these aspects of his life.        Psychiatric Review Of Systems:  anhedonia, decreased sexual drive, depression, sleep disturbance and suicidal ideations  --Depression: as above  --Anxiety: denies excessive worry  --Psychosis: denies AVH or paranoia.   --Marilu: The patient does report a history consistent with bipolar spectrum disorder most likely bipolar 2.  He reports needing to sleep for only perhaps 2 hours for 4 days at a time and these occur sooner cyclically perhaps 1-2 times a year.  In these periods of time he is with excessive energy and feelings of euphoria as well as having many uncharacteristic behaviors.  States for example during one of these periods he went and bought a car just on impulse without having any prior research or waking at that morning just with the idea that seemed variable time other than lead to further financial distress.  Denies any grandiosity during these periods but denies any psychotic factors.  Does report being more distractible and more irritable as well as faster thoughts and speech increased psychomotor activity.      History:  -Past neuropsychiatric history: Bipolar spectrum d/o  -Psychiatric Hospitalizations: Patient has had no prior hospitalizations.  -Suicide Attempts: Patient has had no prior suicide attempts.  -Firearm Access: denies  -Prior Treatment and Medications Tried: Seroquel - shortly, uncertain dose or duration; Zyprexa - stopped due to wt gain.  Just  started Lamictal.  Has been on BuSpar that was not helpful 15 mg twice a day.  Has been on Klonopin shortly twice a day for anxiety.  Zoloft and mirtazapine: been ongoing to help with mood and sleep to fair affect.  Seen at [facility] for therapy & medications.   -History of violence or legal issues: The patient has no significant history of legal issues.  Has filed for bankruptcy.    Substance Use:   --Nicotine: vapes, 6 mg cartridge / day   --Caffeine: 3 cups x 10 oz of coffee / day   --EtOH: binge use, before drinking last night last use was 2 months ago.  No SZ or complicated detox hx; no prior detox.    --THC: denies   --Illicits: denies    --Abuse/Trauma/Neglect/Exploitation: denies      Social History:  --> From the area.  He is an only son.  Raised by his mother and father until they  when he was in his senior year of high school.  Close with his parents.  States he went to college afterwards for 3 semesters on but was too focused on his girlfriend at that time and so returned home and has had multiple jobs in the interim.  States his goal are mostly premedicine and considered medical school.  He is been working as a  has been enjoying the job just recently offered a promotion.  Eyes no significant other currently.  Has had 1 relationship lasting 6 years with her was a mutual split 2 years ago.  Does not consider himself Christianity or spiritual.  He does report a lot of high expectations placed from his parents but states that they're very supportive.  Reports this history consistent with him having internalized these expectations and reports that he is often overly critical of himself.  Social History     Social History   • Marital status: Single     Spouse name: N/A   • Number of children: N/A   • Years of education: N/A     Occupational History   • Not on file.     Social History Main Topics   • Smoking status: Current Some Day Smoker     Types: Electronic Cigarette   • Smokeless tobacco:  Never Used   • Alcohol use 2.4 oz/week     2 Cans of beer, 2 Shots of liquor per week   • Drug use: No   • Sexual activity: Yes     Birth control/ protection: Condom     Other Topics Concern   • Not on file     Social History Narrative   • No narrative on file         Family History:  History reviewed. No pertinent family history.  -->Further details: Family Suicides: denies; denies MH Hx      Past Medical and Surgical History:  Past Medical History:   Diagnosis Date   • Acquired hypothyroidism    • Allergic rhinitis    • Bilateral conjunctivitis    • Bipolar 1 disorder (CMS/HCC)    • BOM (bilateral otitis media)    • General medical examination     General examination of patient - college physical      • Hypertensive disorder    • Insect bite     Insect bite - wound - History of possible spider bite.      • Kidney stones, calcium oxalate    • Upper respiratory infection      Denies SZ hx    Past Surgical History:   Procedure Laterality Date   • CYSTOSCOPY  11/14/2014     Right retrograde, ureteroscpoy, laser lithotripsy and J stent placement.    • KIDNEY STONE SURGERY         Allergies:  Penicillins    Prescriptions Prior to Admission   Medication Sig Dispense Refill Last Dose   • busPIRone (BUSPAR) 15 MG tablet Take 1 tablet by mouth 2 (Two) Times a Day. 60 tablet 3    • clonazePAM (KlonoPIN) 0.5 MG tablet Take 0.5 mg by mouth 2 (Two) Times a Day As Needed for Seizures.      • lamoTRIgine (LaMICtal) 25 MG tablet Take 25 mg by mouth Daily.      • levothyroxine (SYNTHROID, LEVOTHROID) 25 MCG tablet Take 1 tablet by mouth Daily. 30 tablet 2 Taking   • lurasidone (LATUDA) 40 MG tablet tablet Take 1 tablet by mouth Daily. 30 tablet 5    • mirtazapine (REMERON) 30 MG tablet Take 30 mg by mouth Every Night.      • sertraline (ZOLOFT) 100 MG tablet Take 100 mg by mouth Daily.      • SUMAtriptan (IMITREX) 50 MG tablet Take one tablet at onset of headache. May repeat dose one time in 2 hours if headache not relieved. 10  "tablet 3      --> Not taking Buspar; Klonopin is 0.5mg BID for anxiety; Lamictal 25mg on less than one week for mood; Latuda recently at 40mg, though not helpful; Remeron & zoloft helpful; Imitrex PRN - last used two months ago    Medical Review Of Systems:  Reviewed review of systems from  Dr. Miller's consult note from today.  Reviewed and unchanged: none endorsed.         Objective   Objective --    Vital Signs:  Temp:  [97.2 °F (36.2 °C)] 97.2 °F (36.2 °C)  Heart Rate:  [] 102  Resp:  [18] 18  BP: (132-177)/() 132/86    Physical Exam:   General Appearance: alert, appears stated age and cooperative,  Hygiene:   fair  Gait & Station: Normal  Musculoskeletal: No tremors or abnormal involuntary movements  Pulm: unlaboured     Mental Status Exam:   Cooperation:  Cooperative  Eye Contact:  Downcast  Psychomotor Behavior:  Restless  Mood: \"OK\"  Affect:  mood-incongruent and dysphoric, tearful at times  Speech:  Normal and not overtly pressured  Thought Process:  Coherent  Associations: Goal Directed  Thought Content:     Mood incongruent   Suicidal:  Suicidal Ideation   Homicidal:  None   Hallucinations:  None   Delusion:  Other none overt  Cognitive Functioning:   Consciousness: awake and alert   Orientation:  Person, Place, Time and Situation   Attention: distractible Concentration: World Backwards: 5/5   Language:  Intact Vocabulary: Above Average   Short Term Memory: Intact   Long Term Memory: Intact   Fund of Knowledge: Above Average  Reliability:  fair  Insight:  diminished  Judgement:  Impaired  Impulse Control:  Impaired      Diagnostic Data:    --> EK ms, NSR; compared to prior EKG of Nov is unremarkable, no changes, qtc same, NSR..    Recent Results (from the past 72 hour(s))   Basic Metabolic Panel    Collection Time: 18 10:05 PM   Result Value Ref Range    Glucose 136 (H) 60 - 100 mg/dL    BUN 11 7 - 21 mg/dL    Creatinine 0.75 0.70 - 1.30 mg/dL    Sodium 139 137 - 145 mmol/L    " Potassium 3.4 (L) 3.5 - 5.1 mmol/L    Chloride 103 95 - 110 mmol/L    CO2 20.0 (L) 22.0 - 31.0 mmol/L    Calcium 8.8 8.4 - 10.2 mg/dL    eGFR Non  Amer 126 77 - 179 mL/min/1.73    BUN/Creatinine Ratio 14.7 7.0 - 25.0    Anion Gap 16.0 (H) 5.0 - 15.0 mmol/L   Ethanol    Collection Time: 08/20/18 10:05 PM   Result Value Ref Range    Ethanol 140 (H) 0 - 10 mg/dL    Ethanol % 0.140 %   CBC Auto Differential    Collection Time: 08/20/18 10:05 PM   Result Value Ref Range    WBC 13.32 (H) 3.20 - 9.80 10*3/mm3    RBC 5.58 4.37 - 5.74 10*6/mm3    Hemoglobin 16.7 13.7 - 17.3 g/dL    Hematocrit 46.2 39.0 - 49.0 %    MCV 82.8 80.0 - 98.0 fL    MCH 29.9 26.5 - 34.0 pg    MCHC 36.1 31.5 - 36.3 g/dL    RDW 12.6 11.5 - 14.5 %    RDW-SD 37.9 35.1 - 43.9 fl    MPV 9.5 8.0 - 12.0 fL    Platelets 262 150 - 450 10*3/mm3    Neutrophil % 51.4 37.0 - 80.0 %    Lymphocyte % 31.8 10.0 - 50.0 %    Monocyte % 8.9 0.0 - 12.0 %    Eosinophil % 6.5 0.0 - 7.0 %    Basophil % 0.6 0.0 - 2.0 %    Immature Grans % 0.8 (H) 0.0 - 0.5 %    Neutrophils, Absolute 6.86 2.00 - 8.60 10*3/mm3    Lymphocytes, Absolute 4.23 (H) 0.60 - 4.20 10*3/mm3    Monocytes, Absolute 1.18 (H) 0.00 - 0.90 10*3/mm3    Eosinophils, Absolute 0.86 (H) 0.00 - 0.70 10*3/mm3    Basophils, Absolute 0.08 0.00 - 0.20 10*3/mm3    Immature Grans, Absolute 0.11 (H) 0.00 - 0.02 10*3/mm3   Light Blue Top    Collection Time: 08/20/18 10:05 PM   Result Value Ref Range    Extra Tube hold for add-on    Green Top (Gel)    Collection Time: 08/20/18 10:05 PM   Result Value Ref Range    Extra Tube Hold for add-ons.    Lavender Top    Collection Time: 08/20/18 10:05 PM   Result Value Ref Range    Extra Tube hold for add-on    Urinalysis With Microscopic If Indicated (No Culture) - Urine, Clean Catch    Collection Time: 08/20/18 10:09 PM   Result Value Ref Range    Color, UA Yellow Yellow, Straw, Dark Yellow, Lorena    Appearance, UA Clear Clear    pH, UA 6.0 5.0 - 9.0    Specific Gravity, UA  1.003 1.003 - 1.030    Glucose, UA Negative Negative    Ketones, UA Negative Negative    Bilirubin, UA Negative Negative    Blood, UA Negative Negative    Protein, UA Negative Negative    Leuk Esterase, UA Negative Negative    Nitrite, UA Negative Negative    Urobilinogen, UA 0.2 E.U./dL 0.2 - 1.0 E.U./dL   Urine Drug Screen - Urine, Clean Catch    Collection Time: 08/20/18 10:09 PM   Result Value Ref Range    Amphetamine Screen, Urine Negative Negative    Barbiturates Screen, Urine Negative Negative    Benzodiazepine Screen, Urine Negative Negative    Cocaine Screen, Urine Negative Negative    Methadone Screen, Urine Negative Negative    Opiate Screen Negative Negative    Oxycodone Screen, Urine Negative Negative    THC, Screen, Urine Negative Negative   Ethanol    Collection Time: 08/21/18 12:53 AM   Result Value Ref Range    Ethanol 93 (H) 0 - 10 mg/dL    Ethanol % 0.093 %   Ethanol    Collection Time: 08/21/18  3:15 AM   Result Value Ref Range    Ethanol 48 (H) 0 - 10 mg/dL    Ethanol % 0.048 %   Glucose, Fasting    Collection Time: 08/21/18  6:37 AM   Result Value Ref Range    Glucose, Fasting 105 60 - 110 mg/dL   Lipid Panel    Collection Time: 08/21/18  6:37 AM   Result Value Ref Range    Total Cholesterol 208 (H) 0 - 199 mg/dL    Triglycerides 277 (H) 20 - 199 mg/dL    HDL Cholesterol 38 (L) 60 - 200 mg/dL    LDL Cholesterol  137 (H) 1 - 129 mg/dL    LDL/HDL Ratio 3.02 0.00 - 3.55   Lavender Top    Collection Time: 08/21/18  6:37 AM   Result Value Ref Range    Extra Tube hold for add-on    Comprehensive Metabolic Panel    Collection Time: 08/21/18  6:37 AM   Result Value Ref Range    Glucose 102 (H) 60 - 100 mg/dL    BUN 11 7 - 21 mg/dL    Creatinine 0.84 0.70 - 1.30 mg/dL    Sodium 140 137 - 145 mmol/L    Potassium 4.8 3.5 - 5.1 mmol/L    Chloride 104 95 - 110 mmol/L    CO2 23.0 22.0 - 31.0 mmol/L    Calcium 9.0 8.4 - 10.2 mg/dL    Total Protein 7.4 6.3 - 8.6 g/dL    Albumin 4.20 3.40 - 4.80 g/dL    ALT  (SGPT) 171 (H) 21 - 72 U/L    AST (SGOT) 70 (H) 17 - 59 U/L    Alkaline Phosphatase 86 38 - 126 U/L    Total Bilirubin 0.6 0.2 - 1.3 mg/dL    eGFR Non  Amer 110 77 - 179 mL/min/1.73    Globulin 3.2 2.3 - 3.5 gm/dL    A/G Ratio 1.3 1.1 - 1.8 g/dL    BUN/Creatinine Ratio 13.1 7.0 - 25.0    Anion Gap 13.0 5.0 - 15.0 mmol/L   TSH+Free T4    Collection Time: 08/21/18 10:16 AM   Result Value Ref Range    TSH 5.980 (H) 0.460 - 4.680 mIU/mL    Free T4 1.08 0.78 - 2.19 ng/dL     No results found.      Patient Strengths: ability for insight, average or above intelligence, capable of independent living, communication skills, compliant with medication, general fund of knowledge     Patient Barriers: lack of financial means, no/few hobbies or interests    Assessment/Plan       --> Diagnostic Impression: 28-year-old gentleman admitted for acute alcohol intoxication and suicidal ideation in the setting of worsening depressive symptoms while the context of ongoing psychosocial stressors.    Concerning alcohol use he reports binge drinking.  We will initiate C were protocol to target these symptoms in case there is further call use that is ongoing.  Urine drug screen is negative.    Patient does report ongoing depressive symptoms consistent with major depression but is a history that is also consistent with a bipolar spectrum diagnosis.  On his duration of silvestre is more consistent with hypomania as are his symptoms, all of which were oriented provide further evidence for likely bipolar 2 phenotype.  We will plan to admit to aggressively target his depressive symptomatology and optimize his medications as well as for behavioral interventions.  Discussed medication options at length with patient.  Discussed retrial of Seroquel given its anti-anxiety effects and evidence for mood stability.  I discussed possible trial of lithium.  Also discussed the cognitive dysfunction that can happen of the long-term with Klonopin and  concerns for his long-term use with ability transition to new medication.  He is agreeable.  Also discussed all medications are off label for bipolar 2 and the risk and long-term potential side effects of antipsychotic therapy including dyskinesias, metabolic effects, NMS.    He reports a history of anxiety but states this is not a significant issue denies any prior significant diagnoses on.  We'll continue to monitor for any such issues as they arise as hospitalization.    EKG is unremarkable.  Labs notable for elevated WBC, but no current other sx.  LFT elevated, mild.  Mildly elevated TSH but under 8.        Active Problems:    Suicidal ideation      Assessment:  -SI w/ plans  -Bipolar 2 disorder, most recent episode depression, severe, without psychotic features  -Alcohol use disorder, binge eating subtype  -Nicotine use disorder, mild  -Obesity with BMI of 37 (high risk for metabolic effects of medications).        Treatment Plan:  1) Will admit patient to the behavioral health unit at [facility] to ensure patient safety.  2) Patient will be provided treatment with the unit milieu, activities, therapies and psychopharmacological management.  3) Patient placed on  Q15 minute checks  and Suicide precautions.  4) Dr. Miller consulted for assistance in management of medical co-morbidities.  5) Will order following labs: none currently  6) Will restart patient on the following psychiatric home meds:   --Klonopin, change to 0.25mg BID PRN  --Zoloft 100mg daily for mood  --Remeron 30mg qHS for mood  7) Will make the following medication changes:   --D/C buspar (not taking)  --D/C Latuda given lack of efficacy  --Begin Seroquel 100mg qHS for mood stability & bipolar depression  --Start Seroquel 12.5mg BID PRN for anxiety.    8) Will begin discharge planning as appropriate for patient.  9) Psychotherapy provided: CBT, supportive, educational, insight focused for 25 minutes to target depressive & insomnia  symptoms, coping skills, negative schema from depression  10) Insomnia base behavioral treatments discussed at length including stimulus control and fixed wake time.  11)  Thought log provided with goal of 2-3 examples for tomorrow.     All questions answered for the patient.  Treatment plan and medication risks and benefits discussed with: Patient      Estimated Length of Stay: 3-5 days  Prognosis: good    Francisco Restrepo II, MD  08/21/18  11:55 AM    Dictated using Dragon.

## 2018-08-21 NOTE — CONSULTS
CHIEF COMPLAINT/REASON FOR VISIT:  Suicidal Ideation    HPI:  Patient presented to our ED with the above complaint on August 20 at around 10 PM.  Mother accompanied him to the ED and he reported dark thoughts considering an overdose of drugs and he has difficulty identifying the date of onset.  He had been drinking alcohol the night of presentation and these thoughts became intrusive.    PROBLEM LIST:  Patient Active Problem List    Diagnosis   • Suicidal ideation [R45.851]   • Bipolar disorder, in full remission, most recent episode depressed (CMS/HCC) [F31.76]   • Acquired hypothyroidism [E03.9]   • Allergic rhinitis [J30.9]   • Bilateral conjunctivitis [H10.9]   • BOM (bilateral otitis media) [H66.93]   • General medical examination [Z00.00]     Overview Note:     General examination of patient - college physical        • Hypertensive disorder [I10]   • Insect bite [W57.XXXA]     Overview Note:     Insect bite - wound - History of possible spider bite.        • Kidney stones, calcium oxalate [N20.0]   • Upper respiratory infection [J06.9]   • Bipolar 1 disorder, depressed, moderate (CMS/HCC) [F31.32]   • Anxiety [F41.9]         CURRENT MEDICATIONS:  Prescriptions Prior to Admission   Medication Sig Dispense Refill Last Dose   • busPIRone (BUSPAR) 15 MG tablet Take 1 tablet by mouth 2 (Two) Times a Day. 60 tablet 3    • clonazePAM (KlonoPIN) 0.5 MG tablet Take 0.5 mg by mouth 2 (Two) Times a Day As Needed for Seizures.      • lamoTRIgine (LaMICtal) 25 MG tablet Take 25 mg by mouth Daily.      • levothyroxine (SYNTHROID, LEVOTHROID) 25 MCG tablet Take 1 tablet by mouth Daily. 30 tablet 2 Taking   • lurasidone (LATUDA) 40 MG tablet tablet Take 1 tablet by mouth Daily. 30 tablet 5    • mirtazapine (REMERON) 30 MG tablet Take 30 mg by mouth Every Night.      • sertraline (ZOLOFT) 100 MG tablet Take 100 mg by mouth Daily.      • SUMAtriptan (IMITREX) 50 MG tablet Take one tablet at onset of headache. May repeat dose  one time in 2 hours if headache not relieved. 10 tablet 3        ALLERGIES:  Penicillins      PAST MEDICAL/SURGICAL HISTORY:  Past Medical History:   Diagnosis Date   • Acquired hypothyroidism    • Allergic rhinitis    • Bilateral conjunctivitis    • Bipolar 1 disorder (CMS/HCC)    • BOM (bilateral otitis media)    • General medical examination     General examination of patient - [facility] physical      • Hypertensive disorder    • Insect bite     Insect bite - wound - History of possible spider bite.      • Kidney stones, calcium oxalate    • Upper respiratory infection        Past Surgical History:   Procedure Laterality Date   • CYSTOSCOPY  11/14/2014     Right retrograde, ureteroscpoy, laser lithotripsy and J stent placement.    • KIDNEY STONE SURGERY         Review of Systems   Constitutional: Negative for activity change, appetite change, fatigue and fever.   HENT: Negative for congestion, ear discharge, ear pain, facial swelling, hearing loss, nosebleeds, postnasal drip, rhinorrhea, sinus pressure, sore throat, tinnitus and trouble swallowing.    Eyes: Negative for pain, discharge and visual disturbance.   Respiratory: Negative for cough, shortness of breath and wheezing.    Cardiovascular: Negative for chest pain, palpitations and leg swelling.   Gastrointestinal: Negative for abdominal pain, blood in stool, constipation, diarrhea, nausea and vomiting.   Genitourinary: Negative for difficulty urinating, discharge, dysuria, flank pain, frequency, hematuria, penile pain, penile swelling, scrotal swelling, testicular pain and urgency.   Musculoskeletal: Negative for arthralgias, back pain, joint swelling, myalgias and neck pain.   Skin: Negative for rash and wound.   Neurological: Negative for dizziness, seizures, syncope, weakness, light-headedness and headaches.   Hematological: Negative for adenopathy.       Social History     Social History   • Marital status: Single     Spouse name: N/A   • Number of  "children: N/A   • Years of education: N/A     Occupational History   • Not on file.     Social History Main Topics   • Smoking status: Current Some Day Smoker     Types: Electronic Cigarette   • Smokeless tobacco: Never Used   • Alcohol use 2.4 oz/week     2 Cans of beer, 2 Shots of liquor per week   • Drug use: No   • Sexual activity: Yes     Birth control/ protection: Condom     Other Topics Concern   • Not on file     Social History Narrative   • No narrative on file       History reviewed. No pertinent family history.          Objective     /86 (BP Location: Left arm, Patient Position: Lying)   Pulse 102   Temp 97.2 °F (36.2 °C) (Tympanic)   Resp 18   Ht 180.3 cm (71\")   Wt 121 kg (266 lb 11.2 oz)   SpO2 99%   BMI 37.20 kg/m²     Physical Exam   Constitutional: He appears well-developed and well-nourished.   HENT:   Head: Normocephalic and atraumatic.   Eyes: Conjunctivae and EOM are normal.   Neck: Normal range of motion. Neck supple. No thyromegaly present.   Cardiovascular: Normal rate, regular rhythm and normal heart sounds.  Exam reveals no gallop and no friction rub.    No murmur heard.  Pulmonary/Chest: Effort normal and breath sounds normal. No respiratory distress. He has no wheezes. He has no rales.   Abdominal: Soft. He exhibits no distension and no mass. There is no tenderness. There is no rebound and no guarding.   Musculoskeletal: Normal range of motion.   Lymphadenopathy:     He has no cervical adenopathy.   Neurological: He is alert. He has normal strength and normal reflexes. He displays no tremor and normal reflexes. No cranial nerve deficit or sensory deficit. He exhibits normal muscle tone. Coordination normal. He displays no Babinski's sign on the right side. He displays no Babinski's sign on the left side.   Reflex Scores:       Tricep reflexes are 2+ on the right side and 2+ on the left side.       Bicep reflexes are 2+ on the right side and 2+ on the left side.       " Brachioradialis reflexes are 2+ on the right side and 2+ on the left side.       Patellar reflexes are 2+ on the right side and 2+ on the left side.       Achilles reflexes are 2+ on the right side and 2+ on the left side.  Patient is smiling, happy and very interactive.   Skin: Skin is warm and dry. No rash noted. No erythema.   Nursing note and vitals reviewed.      Dystonia/Tardive Dyskinesia  Absent  Meningeal Signs  Absent    Diagnostic Studies  CBC, CMP,TSH, UDS, acetaminophen level, salicylate level, ethanol level, U/A all normal except    BMP is normal except for minimally elevated glucose at 136 and fasting this morning is 105.  The potassium was 3.4 and CO2 slightly decreased at 20.  CBC is normal except for white count of 13,300 and slight increase in lymphocytes at 4.23.  Urinalysis is unremarkable, urine drug screen is all negative.  Ethanol on August 20 at around 10 PM was 140, on August 21 at almost 1 AM it was 93, and on August 21 at 3 AM it was 48.    TSH on 06/26/2017 was 7.3 but the free T4 was normal at 1.11.    EKG done on August 21 shows:  Vent. Rate : 089 BPM     Atrial Rate : 089 BPM     P-R Int : 170 ms          QRS Dur : 084 ms      QT Int : 346 ms       P-R-T Axes : 037 -16 033 degrees     QTc Int : 420 ms    Normal sinus rhythm  Normal ECG  When compared with ECG of 14-NOV-2014 16:08,  No significant change was found    Assessment/Plan     Past Medical History:   Diagnosis Date   • Acquired hypothyroidism    • Allergic rhinitis    • Bilateral conjunctivitis    • Bipolar 1 disorder (CMS/HCC)    • BOM (bilateral otitis media)    • General medical examination     General examination of patient - college physical      • Hypertensive disorder    • Insect bite     Insect bite - wound - History of possible spider bite.      • Kidney stones, calcium oxalate    • Upper respiratory infection      Hypertension is listed on the problem list including on the last outpatient visit to his PCP on  12/22/2017, but he was not listed as being on an antihypertensive medication at that visit and he was normotensive then.  He was markedly hypertensive with a significant alcohol level during this admission and will monitor this during this admission and use clonidine as needed.    LFTs were not done in the ED and he did have an elevated ALT June 26 of 2017.  So we will get a CMP now which will get us the remainder of the lab work we need.    Hypothyroidism with supplementation.  We will repeat TSH and free T4 here and if TSH is above 8 can consider increasing thyroid supplementation if depression continues.      Continue Home Meds as ordered. Mental health and pain issues managed per psychiatry.  Further diagnostic studies or intervention based on hospital course.

## 2018-08-21 NOTE — ED PROVIDER NOTES
"Subjective   28-year-old male with a history of bipolar disorder comes to the emergency department with his mother on his own volition for having suicidal thoughts.  Patient says that they recently changed his medications and he's been having some \"dark thoughts\".  He also had some alcohol tonight and he said that the dark thoughts became worse.  He said he has never tried to hurt himself before and his plan this time was to overdose on drugs.  He says he didn't take anything tonight other than alcohol.  He is under psychiatric care at this time.        Mental Health Problem   Presenting symptoms: depression and suicidal thoughts    Patient accompanied by:  Parent  Degree of incapacity (severity):  Moderate  Onset quality:  Unable to specify  Timing:  Intermittent  Progression:  Worsening  Chronicity:  Recurrent  Context: alcohol use and recent medication change    Treatment compliance:  All of the time  Time since last psychoactive medication taken:  12 hours  Relieved by:  Nothing  Worsened by:  Nothing  Associated symptoms: anxiety    Associated symptoms: no abdominal pain, no chest pain and no fatigue    Risk factors: family hx of mental illness    Risk factors: no hx of suicide attempts        Review of Systems   Constitutional: Negative for chills, fatigue and fever.   HENT: Negative for congestion and sore throat.    Eyes: Negative for visual disturbance.   Respiratory: Negative for cough and shortness of breath.    Cardiovascular: Negative for chest pain and leg swelling.   Gastrointestinal: Negative for abdominal pain, nausea and vomiting.   Genitourinary: Negative for dysuria.   Musculoskeletal: Negative for back pain.   Skin: Negative for rash.   Neurological: Negative for dizziness and weakness.   Psychiatric/Behavioral: Positive for suicidal ideas. Negative for behavioral problems. The patient is nervous/anxious.    All other systems reviewed and are negative.      Past Medical History:   Diagnosis Date "   • Acquired hypothyroidism    • Allergic rhinitis    • Bilateral conjunctivitis    • Bipolar 1 disorder (CMS/HCC)    • BOM (bilateral otitis media)    • General medical examination     General examination of patient - college physical      • Hypertensive disorder    • Insect bite     Insect bite - wound - History of possible spider bite.      • Kidney stones, calcium oxalate    • Upper respiratory infection        Allergies   Allergen Reactions   • Penicillins Rash       Past Surgical History:   Procedure Laterality Date   • CYSTOSCOPY  11/14/2014     Right retrograde, ureteroscpoy, laser lithotripsy and J stent placement.    • KIDNEY STONE SURGERY         History reviewed. No pertinent family history.    Social History     Social History   • Marital status: Single     Social History Main Topics   • Smoking status: Current Some Day Smoker     Types: Electronic Cigarette   • Smokeless tobacco: Never Used   • Alcohol use 2.4 oz/week     2 Cans of beer, 2 Shots of liquor per week   • Drug use: No   • Sexual activity: Yes     Birth control/ protection: Condom     Other Topics Concern   • Not on file           Objective   Physical Exam   Constitutional: He is oriented to person, place, and time. He appears well-developed and well-nourished.   HENT:   Head: Normocephalic and atraumatic.   Eyes: Pupils are equal, round, and reactive to light. EOM are normal.   Neck: Normal range of motion. Neck supple.   No midline tenderness   Cardiovascular: Normal rate, regular rhythm, normal heart sounds and intact distal pulses.  Exam reveals no gallop and no friction rub.    No murmur heard.  Pulmonary/Chest: Breath sounds normal. No respiratory distress. He has no wheezes. He has no rales.   No rhonchi   Abdominal: Soft. Bowel sounds are normal. He exhibits no distension. There is no tenderness.   Musculoskeletal: Normal range of motion. He exhibits no tenderness or deformity.   Neurological: He is alert and oriented to person,  place, and time. No cranial nerve deficit. He exhibits normal muscle tone. Coordination normal.   Skin: Skin is warm and dry. No rash noted.   Psychiatric: He has a normal mood and affect. His behavior is normal.   Patient has had some suicidal thoughts but has had forethought to come to the emergency department for help.       Procedures           ED Course                  MDM  Number of Diagnoses or Management Options  Diagnosis management comments: 11:06 PM the workup is essentially negative with the exception of the alcohol level being slightly elevated.  Sclerae need to have that be normal before we can get him evaluated by [facility] health services    1:22 AM still waiting on the repeat alcohol level.  A she will be signed out to Dr. Jules for referral to [facility]       Amount and/or Complexity of Data Reviewed  Clinical lab tests: ordered and reviewed  Tests in the radiology section of CPT®: ordered and reviewed          Final diagnoses:   Suicidal ideation            Paolo Montano MD  08/21/18 0124

## 2018-08-21 NOTE — ED NOTES
Pt has been battling depression for years and it has gotten worse lately. Pt does see a therapist with [facility].      Dotty Lou, RN  08/20/18 [id number]

## 2018-08-21 NOTE — PLAN OF CARE
Problem: Patient Care Overview  Goal: Discharge Needs Assessment  Outcome: Ongoing (interventions implemented as appropriate)    Goal: Interprofessional Rounds/Family Conf  Outcome: Ongoing (interventions implemented as appropriate)      Problem: Overarching Goals (Adult)  Goal: Adheres to Safety Considerations for Self and Others  Outcome: Ongoing (interventions implemented as appropriate)    Goal: Optimized Coping Skills in Response to Life Stressors  Outcome: Ongoing (interventions implemented as appropriate)    Goal: Develops/Participates in Therapeutic [place] to Support Successful Transition  Outcome: Ongoing (interventions implemented as appropriate)      Problem: Suicidal Behavior (Adult)  Goal: Suicidal Behavior is Absent/Minimized/Managed  Outcome: Ongoing (interventions implemented as appropriate)

## 2018-08-21 NOTE — SIGNIFICANT NOTE
"   08/21/18 7524   Individual Counseling   Length of Session 30   Topic Initial Assessment   Patient Response CSW met with pt 1:1 and completed psychosocial assessment and BPRS. Pt presented to the interview room casually dressed, alert and oriented x3. Mood is euphoric, affect is congruent. Pt makes good eye contact, speech is somewhat hyperverbal. Pt is pleasant and cooperative, engages in conversation appropriately. Pt stated, re: admission, \"I have been really stressed with work and I have just been in a dark place for the past 2 weeks. Last night I started drinking and came head to head with my dark thoughts. I got to the point where I just wanted to go to sleep. But, I didnt want to actually hurt myself.\" Pt notes that he has suffered from depression for \"a long time\" and has been active in outpatient therapy for a period of time. However, pt states \"everything has just been building up for the past few weeks and I found myself in a deep dark place.\" Pt denies having an active plan for suicide, rather, states that he was hopeless and helpless. Pt identifies recent job changes and expectations at his job as being primary stressors in his life. Pt notes that his family is supportive, says \"my mom is my best friend.\" Pt does note that he has been drinking more recently, but says that \"I usually drink about once a month, 5 or 6 drinks.\" CSW discuse concerns re: alcohol use, specifically using alcohol as a coping mechanism. CSW educated pt on risks associated with use and advised pt to abstain. CSW discussed ways to effectively cope with stress other than alcohol. CSW provided CBT and discussed importance of thought logging and cognitive restructuring, as  pt and MD had similar discussion this am. Pt is receptive to education and therapy, appears to have good insight and fair judgement. Plan is for pt to participate in individual and group tx, remain med compliant. Tx team will meet and develop tx plan. CSW to " follow up accordingly.

## 2018-08-21 NOTE — ED NOTES
Contacted Behavioral health. Will not see pt until ethanol is within legal limit.      Sary Giordano, RN  08/20/18 6965

## 2018-08-21 NOTE — ED NOTES
"Pt has a two year hx of depression and sees a therapist at [facility] currently. They have diagnosed him with bipolar disorder and depression. Pt states over the past two weeks he has been \"low lately, depressed, and having a lot of dark thoughts\". Pt states he wants to OD, sleep, and not wake up. Pt denies any external stressors.      Sary Giordano, RN  08/20/18 2222    "

## 2018-08-22 PROCEDURE — 99232 SBSQ HOSP IP/OBS MODERATE 35: CPT | Performed by: PSYCHIATRY & NEUROLOGY

## 2018-08-22 RX ADMIN — Medication 12.5 MG: at 13:00

## 2018-08-22 RX ADMIN — SERTRALINE HYDROCHLORIDE 100 MG: 50 TABLET ORAL at 08:25

## 2018-08-22 RX ADMIN — QUETIAPINE 100 MG: 100 TABLET ORAL at 20:25

## 2018-08-22 RX ADMIN — MIRTAZAPINE 30 MG: 15 TABLET, FILM COATED ORAL at 20:25

## 2018-08-22 RX ADMIN — LEVOTHYROXINE SODIUM 25 MCG: 25 TABLET ORAL at 08:25

## 2018-08-22 RX ADMIN — Medication 100 MG: at 08:25

## 2018-08-22 NOTE — NURSING NOTE
Behavior     Anxiety: Patient denies at this time  Depression: Depressed mood  Pain  0  AVH   no  S/I   no  H/I   no    Affect   euthymic/normal    Note: Patient was up in dayroom this morning. Patient had relaxed body language and well kempt. 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 Patient has appropriate eye contact and is pleasant. Needs met at this time.      Intervention    Instructed in medication usage and effects  Medications administered as ordered  Encouraged to verbalize needs      Response    Verbalized understanding   Did patient take medications as ordered yes   Did patient interact with assessment?  yes     Plan    Will monitor for safety  Will monitor every 15 minutes as ordered  Will evaluate and promote the plan of care

## 2018-08-22 NOTE — PLAN OF CARE
Problem: Patient Care Overview  Goal: Discharge Needs Assessment  Outcome: Ongoing (interventions implemented as appropriate)    Goal: Interprofessional Rounds/Family Conf  Outcome: Ongoing (interventions implemented as appropriate)      Problem: Overarching Goals (Adult)  Goal: Adheres to Safety Considerations for Self and Others  Outcome: Ongoing (interventions implemented as appropriate)    Goal: Optimized Coping Skills in Response to Life Stressors  Outcome: Ongoing (interventions implemented as appropriate)    Goal: Develops/Participates in Therapeutic [place] to Support Successful Transition  Outcome: Ongoing (interventions implemented as appropriate)      Problem: Suicidal Behavior (Adult)  Goal: Suicidal Behavior is Absent/Minimized/Managed  Outcome: Ongoing (interventions implemented as appropriate)

## 2018-08-22 NOTE — PLAN OF CARE
Problem: Patient Care Overview  Goal: Plan of Care Review  Outcome: Ongoing (interventions implemented as appropriate)   08/21/18 1352 08/21/18 1958   Plan of Care Review   Progress --  improving   Coping/Psychosocial   Plan of Care Reviewed With patient --    Coping/Psychosocial   Patient Agreement with Plan of Care agrees --      Goal: Individualization and Mutuality  Outcome: Ongoing (interventions implemented as appropriate)    Goal: Discharge Needs Assessment  Outcome: Ongoing (interventions implemented as appropriate)    Goal: Interprofessional Rounds/Family Conf  Outcome: Ongoing (interventions implemented as appropriate)      Problem: Overarching Goals (Adult)  Goal: Adheres to Safety Considerations for Self and Others  Outcome: Ongoing (interventions implemented as appropriate)   08/21/18 1958   Overarching Goals (Adult)   Adheres to Safety Considerations for Self and Others making progress toward outcome     Goal: Optimized Coping Skills in Response to Life Stressors  Outcome: Ongoing (interventions implemented as appropriate)   08/21/18 1958   Overarching Goals (Adult)   Optimized Coping Skills in Response to Life Stressors making progress toward outcome     Goal: Develops/Participates in Therapeutic [place] to Support Successful Transition  Outcome: Ongoing (interventions implemented as appropriate)   08/21/18 1958   Overarching Goals (Adult)   Develops/Participates in Therapeutic [place] to Support Successful Transition making progress toward outcome       Problem: Suicidal Behavior (Adult)  Goal: Suicidal Behavior is Absent/Minimized/Managed  Outcome: Ongoing (interventions implemented as appropriate)   08/21/18 1958   Suicidal Behavior is Absent/Minimized/Managed   Suicidal Behavior Managed/Minimized Action Step (STG) Outcome making progress toward outcome

## 2018-08-22 NOTE — NURSING NOTE
Behavior     Anxiety: Patient denies at this time  Depression: Depressed mood  Pain  0  AVH   no  S/I   no  H/I   no    Affect   mood-incongruent    Note: Patient sitting in common area. Patient is calm and cooperative. Patient states that he is depressed, but denies suicidal ideation. Denies pain. Patient says that he is more hopeful today. Appropriate eye contact. Took medications as ordered. Encouraged open communication with staff. No S/S of distress. Will continue to monitor.       Intervention    Instructed in medication usage and effects  Medications administered as ordered  Encouraged to verbalize needs      Response    Verbalized understanding   Did patient take medications as ordered yes   Did patient interact with assessment?  yes     Plan    Will monitor for safety  Will monitor every 15 minutes as ordered  Will evaluate and promote the plan of care  Will provide safe, calm, quiet environment.

## 2018-08-22 NOTE — PROGRESS NOTES
Psychiatry Progress Note  8/22/2018        Chief Complaint: suicidal ideation, depression and EtOH Use      Subjective --  Ravi Reid is a 28 y.o. male who was seen on the adult unit.  He is pleasant and engaged well.      Patient reports tolerating sleep well.  Thoughts like he fell asleep easier and stated asleep longer.  Appetite improved.  Energy is mildly improved.  Still reports ongoing depression but reports that this has diminished since his hospital stay.  He denies any SI or HI.  Denies any AVH or paranoia.  Denies any adverse effects to medications.  No headache or stomachache.  No cravings.      Is working on thought log today.          Objective   Objective --      Vital Signs:  Temp:  [96.8 °F (36 °C)] 96.8 °F (36 °C)  Heart Rate:  [74] 74  Resp:  [18] 18  BP: (132-138)/(86-96) 138/96    Physical Exam:   -General Appearance: alert, appears stated age, interactive and cooperative,  -Hygiene:   good  -Gait & Station: Normal  -Musculoskeletal: No tremors or abnormal involuntary movements  -Pulm: unlaboured     Mental Status Exam:   -Cooperation:  Cooperative  -Eye Contact:  Fair  -Psychomotor Behavior:  Appropriate  -Mood: Improving  -Affect:  mood-congruent  -Speech:  Normal  -Thought Process:  Coherent  -Associations: Goal Directed  -Thought Content:     --Normal and Mood congurent   --Suicidal:  None   --Homicidal:  None   --Hallucinations:  None   --Delusion:  None  --Cognitive Functioning:   --Consciousness: awake and alert and Fund of Knowledge: Above Average  --Reliability:  fair  --Insight:  improving  --Judgement:  improving  --Impulse Control:  improving      Lab Results (last 24 hours)     Procedure Component Value Units Date/Time    Comprehensive Metabolic Panel [[id number]]  (Abnormal) Collected:  08/21/18 0637    Specimen:  Blood Updated:  08/21/18 1107     Glucose 102 (H) mg/dL      BUN 11 mg/dL      Creatinine 0.84 mg/dL      Sodium 140 mmol/L      Potassium 4.8 mmol/L       Chloride 104 mmol/L      CO2 23.0 mmol/L      Calcium 9.0 mg/dL      Total Protein 7.4 g/dL      Albumin 4.20 g/dL      ALT (SGPT) 171 (H) U/L      AST (SGOT) 70 (H) U/L      Alkaline Phosphatase 86 U/L      Total Bilirubin 0.6 mg/dL      eGFR Non African Amer 110 mL/min/1.73      Globulin 3.2 gm/dL      A/G Ratio 1.3 g/dL      BUN/Creatinine Ratio 13.1     Anion Gap 13.0 mmol/L     TSH+Free T4 [[id number]]  (Abnormal) Collected:  08/21/18 1016    Specimen:  Blood Updated:  08/21/18 1105     TSH 5.980 (H) mIU/mL      Free T4 1.08 ng/dL     Extra Tubes [[id number]] Collected:  08/21/18 0637    Specimen:  Blood from Blood, Venous Line Updated:  08/21/18 0745    Narrative:       The following orders were created for panel order Extra Tubes.  Procedure                               Abnormality         Status                     ---------                               -----------         ------                     Lavender Top[[id number]]                                     Final result                 Please view results for these tests on the individual orders.    Lavender Top [[id number]] Collected:  08/21/18 0637    Specimen:  Blood Updated:  08/21/18 0745     Extra Tube hold for add-on     Comment: Auto resulted             Imaging Results (last 24 hours)     ** No results found for the last 24 hours. **            Medications:     Current Facility-Administered Medications:   •  acetaminophen (TYLENOL) tablet 650 mg, 650 mg, Oral, Q4H PRN, Francisco Restrepo II, MD  •  aluminum-magnesium hydroxide-simethicone (MAALOX MAX) 400-400-40 MG/5ML suspension 15 mL, 15 mL, Oral, Q6H PRN, Francisco Restrepo II, MD  •  clonazePAM (KlonoPIN) tablet 0.25 mg, 0.25 mg, Oral, BID PRN, Francisco Restrepo II, MD  •  CloNIDine (CATAPRES) tablet 0.1 mg, 0.1 mg, Oral, Q4H PRN, Francisco Restrepo II, MD  •  hydrOXYzine (VISTARIL) capsule 50 mg, 50 mg, Oral, Q6H PRN, Francisco Restrepo II, MD  •  levothyroxine (SYNTHROID,  LEVOTHROID) tablet 25 mcg, 25 mcg, Oral, Daily, Francisco Restrepo II, MD, 25 mcg at 08/21/18 0846  •  loperamide (IMODIUM) capsule 2 mg, 2 mg, Oral, 4x Daily PRN, Francisco Restrepo II, MD  •  LORazepam (ATIVAN) tablet 1 mg, 1 mg, Oral, Q2H PRN **OR** LORazepam (ATIVAN) tablet 2 mg, 2 mg, Oral, Q1H PRN **OR** LORazepam (ATIVAN) injection 2 mg, 2 mg, Intramuscular, Q15 Min PRN, Francisco Restrepo II, MD  •  LORazepam (ATIVAN) tablet 2 mg, 2 mg, Oral, Q6H PRN, Francisco Restrepo II, MD  •  magnesium hydroxide (MILK OF MAGNESIA) suspension 2400 mg/10mL 10 mL, 10 mL, Oral, Daily PRN, Francisco Restrepo II, MD  •  mirtazapine (REMERON) tablet 30 mg, 30 mg, Oral, Nightly, Francisco Restrepo II, MD, 30 mg at 08/21/18 2044  •  ondansetron ODT (ZOFRAN-ODT) disintegrating tablet 4 mg, 4 mg, Oral, Q6H PRN, Francisco Restrepo II, MD  •  QUEtiapine (SEROquel) half tablet 12.5 mg, 12.5 mg, Oral, BID PRN, Francisco Restrepo II, MD  •  QUEtiapine (SEROquel) tablet 100 mg, 100 mg, Oral, Nightly, Francisco Restrepo II, MD, 100 mg at 08/21/18 2044  •  sertraline (ZOLOFT) tablet 100 mg, 100 mg, Oral, Daily, Francisco Restrepo II, MD, 100 mg at 08/21/18 1546  •  SUMAtriptan (IMITREX) tablet 25 mg, 25 mg, Oral, Once PRN, Francisco Restrepo II, MD  •  thiamine (VITAMIN B-1) tablet 100 mg, 100 mg, Oral, Daily, Francisco Restrepo II, MD, 100 mg at 08/21/18 1546  •  traZODone (DESYREL) tablet 50 mg, 50 mg, Oral, Nightly PRN, Francisco Restrepo II, MD      Diagnoses/Assessment:   Active Problems:    Bipolar 2 disorder, major depressive episode (CMS/HCC)    Anxiety    Suicidal ideation    Nicotine use disorder    Alcohol consumption binge drinking        --> IMPRESSION: 27 y/o M voicing improvements.  Tolerating medications well.  Will continue to increase his Seroquel for mood stability.  Will also address mood by cognitive dysfunctions.        Treatment Plan:  1) Will continue care for the patient on  the behavioral health unit at [facility] to ensure patient safety.  2) Will continue to provide treatment with the unit milieu, activities, therapies and psychopharmacological management.  3) Patient to be placed on or continued on  Q15 minute checks  and Suicide precautions.  4) Pertinent Concurrent Non-Psychiatric Medical issues: hypothyroid on synthroid  5) Will order following labs: none  6) Will make the following medication changes:   --Seroquel to 250mg qHS for mood stability & bipolar d/o  --Increase Seroquel to 25mg TID PRN anxiety.    --Decrease Klonopin to 0.25mg daily, consider d/c tomorrow if now using  7) Will continue discharge planning as appropriate for patient.  8) Psychotherapy provided: CBT for <16 minutes to target Anxiety, Bipolar/depression symptoms, cognitive dysfunctions; pt to continue working on thought logs and will review tomorrow.     Treatment plan and medication risks and benefits discussed with: Patient    Francisco Restrepo II, MD  08/22/18      Dictated using Dragon.

## 2018-08-22 NOTE — PROGRESS NOTES
Met with patient to complete Recreation Therapy Assessment.  Patient states he works as a  and has good family support.  He states that he enjoys watching TV, cooking, and spending time with his family.  He does not identify any specific activity he did in his past.  Patient states he is good at talking but he is often uncomfortable around others.  Patient reports that has low self-esteem and his therapist has been trying to work with him on that.  Patient will be encouraged to participate in all groups and identify new coping strategies.

## 2018-08-22 NOTE — NURSING NOTE
Patient approached desk and requested medication for anxiety. Patient displayed anxiousness by tapping his fingers repeatedly on the desk.   When given the prn Seroquel at 1300 patient had quick head movements with intense eye contact.

## 2018-08-23 PROCEDURE — 99232 SBSQ HOSP IP/OBS MODERATE 35: CPT | Performed by: PSYCHIATRY & NEUROLOGY

## 2018-08-23 RX ADMIN — SERTRALINE HYDROCHLORIDE 100 MG: 50 TABLET ORAL at 08:27

## 2018-08-23 RX ADMIN — QUETIAPINE 250 MG: 100 TABLET ORAL at 20:35

## 2018-08-23 RX ADMIN — LEVOTHYROXINE SODIUM 25 MCG: 25 TABLET ORAL at 08:27

## 2018-08-23 RX ADMIN — MIRTAZAPINE 30 MG: 15 TABLET, FILM COATED ORAL at 20:34

## 2018-08-23 RX ADMIN — Medication 100 MG: at 08:27

## 2018-08-23 NOTE — NURSING NOTE
Behavior     Anxiety: Patient denies at this time  Depression: Depressed mood  Pain  0  AVH   no  S/I   no  H/I   no    Affect   euthymic/normal    Note: Patient in visitation room watching TV and interacting with peers. Patient stated that he had a good day and is still depressed but is feeling and doing much better. Patient has relaxed body language. Denies anxiety, pain, AVH, SI, and HI. Appropriate eye contact. Took medications as ordered. Encouraged open communication with staff. No S/S of distress. Will continue to monitor.       Intervention    Instructed in medication usage and effects  Medications administered as ordered  Encouraged to verbalize needs      Response    Verbalized understanding   Did patient take medications as ordered yes   Did patient interact with assessment?  yes     Plan    Will monitor for safety  Will monitor every 15 minutes as ordered  Will evaluate and promote the plan of care  Will provide safe, calm, quiet environment.

## 2018-08-23 NOTE — PLAN OF CARE
Problem: Patient Care Overview  Goal: Plan of Care Review  Outcome: Ongoing (interventions implemented as appropriate)   08/21/18 1958 08/22/18 0944   Plan of Care Review   Progress improving --    Coping/Psychosocial   Plan of Care Reviewed With --  patient   Coping/Psychosocial   Patient Agreement with Plan of Care --  agrees     Goal: Individualization and Mutuality  Outcome: Ongoing (interventions implemented as appropriate)    Goal: Discharge Needs Assessment  Outcome: Ongoing (interventions implemented as appropriate)   08/22/18 0944   Discharge Needs Assessment   Patient/Family Anticipates Transition to home   Transportation Concerns car, none   Transportation Anticipated car, drives self   Discharge Needs Assessment,    Outpatient/Agency/Support Group Needs outpatient counseling;outpatient medication management;outpatient psychiatric care (specify)   Anticipated Discharge Disposition home or self-care     Goal: Interprofessional Rounds/Family Conf  Outcome: Ongoing (interventions implemented as appropriate)      Problem: Overarching Goals (Adult)  Goal: Adheres to Safety Considerations for Self and Others  Outcome: Outcome(s) achieved Date Met: 08/22/18 08/22/18 2150   Overarching Goals (Adult)   Adheres to Safety Considerations for Self and Others making progress toward outcome     Goal: Optimized Coping Skills in Response to Life Stressors  Outcome: Ongoing (interventions implemented as appropriate)   08/22/18 2150   Overarching Goals (Adult)   Optimized Coping Skills in Response to Life Stressors making progress toward outcome     Goal: Develops/Participates in Therapeutic [place] to Support Successful Transition  Outcome: Ongoing (interventions implemented as appropriate)   08/22/18 2150   Overarching Goals (Adult)   Develops/Participates in Therapeutic [place] to Support Successful Transition making progress toward outcome       Problem: Suicidal Behavior (Adult)  Goal: Suicidal Behavior is  Absent/Minimized/Managed  Outcome: Ongoing (interventions implemented as appropriate)   08/22/18 3791   Suicidal Behavior is Absent/Minimized/Managed   Suicidal Behavior Managed/Minimized Action Step (STG) Outcome making progress toward outcome

## 2018-08-23 NOTE — PLAN OF CARE
Problem: Overarching Goals (Adult)  Goal: Optimized Coping Skills in Response to Life Stressors  Outcome: Ongoing (interventions implemented as appropriate)    Goal: Develops/Participates in Therapeutic [place] to Support Successful Transition  Outcome: Ongoing (interventions implemented as appropriate)      Problem: Suicidal Behavior (Adult)  Goal: Suicidal Behavior is Absent/Minimized/Managed  Outcome: Ongoing (interventions implemented as appropriate)

## 2018-08-23 NOTE — PROGRESS NOTES
Psychiatry Progress Note  8/23/2018        Chief Complaint: suicidal ideation and depression      Subjective --  Ravi Reid is a 28 y.o. male who was seen on the adult unit.      Pt is pleasant, engaged well.  Reports improved sleep overnight w/out issues.  Appetite fair.  Energy improving.  Mood is improving.  Denies any SI/HI/AVH/paranoia.  Anxiety reduced.  Has been working on thought logs.  These are reviewed & processed with the pt; they re: expectations and self image.  Pt is able to talk about his mother without crying.  Denies any adverse effects to medications.  No HA or stomachache.           Objective   Objective --      Vital Signs:  Temp:  [96.4 °F (35.8 °C)-98 °F (36.7 °C)] 98 °F (36.7 °C)  Heart Rate:  [66-87] 66  Resp:  [18] 18  BP: (126-135)/(83-88) 126/83    Physical Exam:   -General Appearance: alert, appears stated age, interactive and cooperative,  -Hygiene:   good  -Gait & Station: Normal  -Musculoskeletal: No tremors or abnormal involuntary movements; no cogwheeling or rigidity.  -HEENT: No Nystagmus or Opthalmoplegia.    -Pulm: unlaboured    Mental Status Exam:   -Cooperation:  Cooperative  -Eye Contact:  Fair  -Psychomotor Behavior:  Appropriate  -Mood: Improving  -Affect:  mood-congruent  -Speech:  Normal  -Thought Process:  Coherent  -Associations: Goal Directed  -Thought Content:     --Normal and Mood congurent   --Suicidal:  None   --Homicidal:  None   --Hallucinations:  None   --Delusion:  None  --Cognitive Functioning:   --Consciousness: awake and alert and Fund of Knowledge: Above Average  --Reliability:  fair  --Insight:  Fair  --Judgement:  improving  --Impulse Control:  improving      Lab Results (last 24 hours)     ** No results found for the last 24 hours. **          Imaging Results (last 24 hours)     ** No results found for the last 24 hours. **            Medications:     Current Facility-Administered Medications:   •  acetaminophen (TYLENOL) tablet 650 mg, 650  mg, Oral, Q4H PRN, Francisco Restrepo II, MD  •  aluminum-magnesium hydroxide-simethicone (MAALOX MAX) 400-400-40 MG/5ML suspension 15 mL, 15 mL, Oral, Q6H PRN, Francisco Restrepo II, MD  •  clonazePAM (KlonoPIN) tablet 0.25 mg, 0.25 mg, Oral, BID PRN, Francisco Restrepo II, MD  •  CloNIDine (CATAPRES) tablet 0.1 mg, 0.1 mg, Oral, Q4H PRN, Francisco Restrepo II, MD  •  hydrOXYzine (VISTARIL) capsule 50 mg, 50 mg, Oral, Q6H PRN, Francisco Restrepo II, MD  •  levothyroxine (SYNTHROID, LEVOTHROID) tablet 25 mcg, 25 mcg, Oral, Daily, Francisco Restrepo II, MD, 25 mcg at 08/23/18 0827  •  loperamide (IMODIUM) capsule 2 mg, 2 mg, Oral, 4x Daily PRN, Francisco Restrepo II, MD  •  LORazepam (ATIVAN) tablet 1 mg, 1 mg, Oral, Q2H PRN **OR** LORazepam (ATIVAN) tablet 2 mg, 2 mg, Oral, Q1H PRN **OR** LORazepam (ATIVAN) injection 2 mg, 2 mg, Intramuscular, Q15 Min PRN, Francisco Restrepo II, MD  •  LORazepam (ATIVAN) tablet 2 mg, 2 mg, Oral, Q6H PRN, Francisco Restrepo II, MD  •  magnesium hydroxide (MILK OF MAGNESIA) suspension 2400 mg/10mL 10 mL, 10 mL, Oral, Daily PRN, Francisco Restrepo II, MD  •  mirtazapine (REMERON) tablet 30 mg, 30 mg, Oral, Nightly, Francisco Restrepo II, MD, 30 mg at 08/22/18 2025  •  ondansetron ODT (ZOFRAN-ODT) disintegrating tablet 4 mg, 4 mg, Oral, Q6H PRN, Francisco Restrepo II, MD  •  QUEtiapine (SEROquel) half tablet 12.5 mg, 12.5 mg, Oral, BID PRN, Francisco Restrepo II, MD, 12.5 mg at 08/22/18 1300  •  QUEtiapine (SEROquel) tablet 100 mg, 100 mg, Oral, Nightly, Francisco Restrepo II, MD, 100 mg at 08/22/18 2025  •  sertraline (ZOLOFT) tablet 100 mg, 100 mg, Oral, Daily, Francisco Restrepo II, MD, 100 mg at 08/23/18 0827  •  SUMAtriptan (IMITREX) tablet 25 mg, 25 mg, Oral, Once PRN, Francisco Restrepo II, MD  •  thiamine (VITAMIN B-1) tablet 100 mg, 100 mg, Oral, Daily, Francisco Restrepo II, MD, 100 mg at 08/23/18 0827  •  traZODone (DESYREL)  tablet 50 mg, 50 mg, Oral, Nightly PRN, Francisco Restrepo II, MD      Diagnoses/Assessment:   Active Problems:    Bipolar 2 disorder, major depressive episode (CMS/HCC)    Anxiety    Suicidal ideation    Nicotine use disorder    Alcohol consumption binge drinking        --> IMPRESSION: improving; gaining benefit from medications; will increase Seroquel to mood stability dosing tonight & plan for d/c tomorrow if doing well.  Given the nature of his mood instability, risk of self harm and need for sustainable long-term tx plan, will ensure adequate dosing and toleration of Quetiapine today.  Will plan to use Seroquel given its significant evidence base for bipolar d/o for both anti manic & anti depressant effects.        Treatment Plan:  1) Will continue care for the patient on the behavioral health unit at [facility] to ensure patient safety.  2) Will continue to provide treatment with the unit milieu, activities, therapies and psychopharmacological management.  3) Patient to be placed on or continued on  Q15 minute checks  and Suicide precautions.  4) Pertinent Concurrent Non-Psychiatric Medical issues: none significant  5) Will order following labs: none  6) Will make the following medication changes:   --D/C Klonopin (none used during this hospitalization)  --Increase Seroquel to 250mg qHS for mood stability & bipolar depression (as change was not performed yesterday)  7) Will continue discharge planning as appropriate for patient.  8) Psychotherapy provided: CBT, educational for 18 minutes to target affective, depressive and anxiety symptoms, thought distortions and coping skills.   9) Pt to work on two more thought logs tonight.     Treatment plan and medication risks and benefits discussed with: Patient    Francisco Restrepo II, MD  08/23/18  1:27 PM

## 2018-08-23 NOTE — NURSING NOTE
"Behavior   Anxiety: none  Depression: None  Pain   0  AVH   no  S/I   no  H/I   no  Affect   calm and pleasant    Pt resting in bed at this time, reading a book.  Good eye contact noted.  Pt stated his depression had gotten a lot worse before he came in, but he is feeling much better.  Rated depression and anxiety 1/10.  Denied SI/HI/AVH.  Pt states he is \"ready to go home\".  Encouraged pt to come to staff with any concerns/needs.  Will continue to monitor.    Intervention  Medications reviewed and administered  Assessment complete    Response  Verbalized understanding   Took medications  Interacted with assessment    Plan  Will promote and reinforce current treatment plan and encourage involvement in care plan goals.   Will provide for safe, calm, quiet environment.  Will promote open communication with staff and foster a trusting/working relationship with patient.   Will promote participation in groups and therapies and independent reflection.      "

## 2018-08-24 VITALS
HEART RATE: 79 BPM | OXYGEN SATURATION: 95 % | RESPIRATION RATE: 18 BRPM | TEMPERATURE: 96.5 F | DIASTOLIC BLOOD PRESSURE: 70 MMHG | BODY MASS INDEX: 37.34 KG/M2 | SYSTOLIC BLOOD PRESSURE: 121 MMHG | HEIGHT: 71 IN | WEIGHT: 266.7 LBS

## 2018-08-24 PROBLEM — F10.10 ALCOHOL CONSUMPTION BINGE DRINKING: Status: RESOLVED | Noted: 2018-08-21 | Resolved: 2018-08-24

## 2018-08-24 PROBLEM — R45.851 SUICIDAL IDEATION: Status: RESOLVED | Noted: 2018-08-21 | Resolved: 2018-08-24

## 2018-08-24 PROCEDURE — 99239 HOSP IP/OBS DSCHRG MGMT >30: CPT | Performed by: PSYCHIATRY & NEUROLOGY

## 2018-08-24 RX ORDER — LANOLIN ALCOHOL/MO/W.PET/CERES
100 CREAM (GRAM) TOPICAL DAILY
Qty: 90 TABLET | Refills: 0 | Status: SHIPPED | OUTPATIENT
Start: 2018-08-25 | End: 2019-02-13

## 2018-08-24 RX ORDER — QUETIAPINE FUMARATE 25 MG/1
50 TABLET, FILM COATED ORAL 2 TIMES DAILY PRN
Qty: 30 TABLET | Refills: 0 | Status: SHIPPED | OUTPATIENT
Start: 2018-08-24 | End: 2019-02-13

## 2018-08-24 RX ORDER — QUETIAPINE FUMARATE 50 MG/1
250 TABLET, FILM COATED ORAL NIGHTLY
Qty: 75 TABLET | Refills: 1 | Status: SHIPPED | OUTPATIENT
Start: 2018-08-24 | End: 2019-02-13

## 2018-08-24 RX ADMIN — Medication 100 MG: at 08:27

## 2018-08-24 RX ADMIN — LEVOTHYROXINE SODIUM 25 MCG: 25 TABLET ORAL at 08:27

## 2018-08-24 RX ADMIN — SERTRALINE HYDROCHLORIDE 100 MG: 50 TABLET ORAL at 08:27

## 2018-08-24 NOTE — NURSING NOTE
"Behavior     Anxiety: Patient denies at this time  Depression: Patient denies at this time  Pain  0  AVH   no  S/I   no  H/I   no    Affect   euthymic/normal    Note: Patient states a \"big change\" since being here, stating,  \"(anxiety) it was really high, and the depression was really high, too.\" Patient said of wanting to hurt himself, \"it was a new thing, just kind of snuck up on me. I just changed some of my meds around. I don't know if that had anything to do with it; a lot of work stressors, too, just everything all at once.\" Patient speaks of plans to handle future anxiety and depression concerns by following up with his therapy and thinking more positively, as well as, \"Talk to somebody, reach out. That's the hardest thing for me, reaching out and admitting (I need help), getting past my pride.\"       Intervention    Instructed in medication usage and effects  Medications administered as ordered  Encouraged to verbalize needs      Response    Verbalized understanding   Did patient take medications as ordered yes   Did patient interact with assessment?  yes     Plan    Will monitor for safety  Will monitor every 15 minutes as ordered  Will evaluate and promote the plan of care    "

## 2018-08-24 NOTE — NURSING NOTE
Behavior     Anxiety: Patient denies at this time  Depression: Patient denies at this time  Pain  0  AVH   no  S/I   no  H/I   no    Affect   mood-congruent    Note: Patient noted with good eye contact, smiles with interaction. Calm, cooperative & engaging.      Intervention    Instructed in medication usage and effects  Medications administered as ordered  Encouraged to verbalize needs      Response    Verbalized understanding   Did patient take medications as ordered yes   Did patient interact with assessment?  yes     Plan    Will monitor for safety  Will monitor every 15 minutes as ordered  Will evaluate and promote the plan of care

## 2018-08-24 NOTE — PLAN OF CARE
Problem: Patient Care Overview  Goal: Plan of Care Review  Outcome: Ongoing (interventions implemented as appropriate)   08/24/18 0900   Plan of Care Review   Progress improving   Coping/Psychosocial   Plan of Care Reviewed With patient   Coping/Psychosocial   Patient Agreement with Plan of Care agrees     Goal: Individualization and Mutuality  Outcome: Ongoing (interventions implemented as appropriate)    Goal: Discharge Needs Assessment  Outcome: Ongoing (interventions implemented as appropriate)      Problem: Overarching Goals (Adult)  Goal: Optimized Coping Skills in Response to Life Stressors  Outcome: Ongoing (interventions implemented as appropriate)    Goal: Develops/Participates in Therapeutic [place] to Support Successful Transition  Outcome: Ongoing (interventions implemented as appropriate)

## 2018-08-24 NOTE — SIGNIFICANT NOTE
"   08/24/18 1223   Individual Counseling   Length of Session 20   Topic Safety/dc Plan   Patient Response LSCW met with pt 1:1 and reviewed safety/dc plan. Pt presented to the dayroom, casually dressed, alert and oriented x3. Mood is good, affect is bright. Speech is normal, makes good eye contact. Pt denies SI.HI, AVH. Pt expresses his desire to return home, was able to provide feedback on the \"reframing his thoughts\" exercise from yesterday. Pt has participated well in individual and group tx, was med compliant. Pt to return home and follow up at [facility]. Pt educated on Crisis Hotline, advised to call as needed. Insight is good, judgement is fair. BPRS was completed upon dc.      "

## 2018-08-24 NOTE — DISCHARGE INSTRUCTIONS
--Continue medications as currently prescribed.  --Continue follow-up with your outpatient providers.  --Return to the ER with any suicidal or homicidal ideation, or worsening symptoms.

## 2018-08-24 NOTE — NURSING NOTE
Patient ambulated from [facility] for discharge to home. Patient stable with no s/sx of distress. All d/c paperwork, follow up appointments discussed with patient. Patient verbalized understanding. All paperwork signed. Prescriptions sent to pharmacy & personal belongings returned to patient.

## 2018-08-24 NOTE — PLAN OF CARE
Problem: Patient Care Overview  Goal: Plan of Care Review   08/24/18 0039   Plan of Care Review   Progress improving   Coping/Psychosocial   Plan of Care Reviewed With patient   Coping/Psychosocial   Patient Agreement with Plan of Care agrees     Goal: Individualization and Mutuality   08/24/18 0039   Personal Strengths/Vulnerabilities   Patient Personal Strengths insight into illness/situation;stable living environment;motivated for treatment   Individualization   Patient Specific Goals (Include Timeframe) take negative thoughts and turn them into something positive. ... follow through and be the best me I can be.   Mutuality/Individual Preferences   How to Address Anxieties/Fears Just try to challenge myself to think more positively     Goal: Discharge Needs Assessment   08/24/18 0039   Discharge Needs Assessment   Readmission Within the Last 30 Days no previous admission in last 30 days   Concerns to be Addressed denies needs/concerns at this time   Patient/Family Anticipates Transition to home   Transportation Concerns car, none   Transportation Anticipated car, drives self   Discharge Needs Assessment,    Outpatient/Agency/Support Group Needs outpatient counseling;outpatient medication management;outpatient psychiatric care (specify)       Problem: Overarching Goals (Adult)  Goal: Optimized Coping Skills in Response to Life Stressors  Outcome: Ongoing (interventions implemented as appropriate)   08/24/18 0039   Overarching Goals (Adult)   Optimized Coping Skills in Response to Life Stressors making progress toward outcome     Intervention: Promote Effective Coping Strategies   08/24/18 0039   Coping/Psychosocial Interventions   Supportive Measures active listening utilized;verbalization of feelings encouraged       Goal: Develops/Participates in Therapeutic [place] to Support Successful Transition  Outcome: Ongoing (interventions implemented as appropriate)   08/24/18 0039   Overarching Goals (Adult)    Develops/Participates in Therapeutic [place] to Support Successful Transition making progress toward outcome     Intervention: Foster Therapeutic [place]   08/24/18 0039   Interventions   Trust Relationship/Rapport care explained;empathic listening provided;questions answered;questions encouraged;thoughts/feelings acknowledged

## 2018-08-24 NOTE — DISCHARGE SUMMARY
"Admission Date: 8/21/2018  Discharge Date: 8/24/2018      Psychiatric History:  Per my H&P on 8/21/18:    \"Mr. Reid is a 28-year-old gentleman with prior psychiatric history of bipolar spectrum disorder.  The patient was admitted very early this morning after presenting to the emergency room acutely intoxicated on alcohol and espousing suicidal ideation with voiced plan to consider overdose plain sleep and not wake up.  Patient was then admitted to the behavioral health unit for further inpatient stabilization and evaluation treatment.     Presents with suicidal ideation, anxiety and depression. Onset of symptoms was gradual starting several weeks ago.  Symptoms have been present on an increasingly more frequent basis. Symptoms are associated with anxiety, insomnia, depressed mood and substance use.  Symptoms are aggravated by educational problems and occupational problems.   Symptoms improve with none identified.  Patient's symptom severity is severe.   Patient reports that level of hopefulness is worsening.  Patient's symptoms occur in the context of job and financial stressors.     Patient reports that symptoms have been going on for several weeks since he had left his prior job as a  tried insurance for several days but did not like this job due to their use of intimidation and bully tactics and ultimately went back to his old job.  States he has been offered a promotion that he intends to take.  But states in the setting of these stressors as well as financial stress from the money that he spent on being able to be license for insurance on his mood has worsened.  He reports depression on a daily for at least the last 2 weeks.  Sleep is been poor with initial insomnia racing thoughts and condition arousal.  Anhedonia.  Ongoing guilt.  Low energy.  Concentration is fair.  Appetite is down with about 5 pounds of weight loss in last month or so.  Psychomotor slowing and restlessness reported.  " "Suicidal ideation as noted above, intermittent more intense over time.  Denies any prior suicide attempts.  Denies firearm access no family history of completed suicide or attempted suicide.  He does report a lot of high expectations placed from his parents but states that they're very supportive.  Reports this history consistent with him having internalized these expectations and reports that he is often overly critical of himself.  Becomes tearful when discussing these aspects of his life. \"      Diagnostic Data:    Recent Results (from the past 168 hour(s))   Basic Metabolic Panel    Collection Time: 08/20/18 10:05 PM   Result Value Ref Range    Glucose 136 (H) 60 - 100 mg/dL    BUN 11 7 - 21 mg/dL    Creatinine 0.75 0.70 - 1.30 mg/dL    Sodium 139 137 - 145 mmol/L    Potassium 3.4 (L) 3.5 - 5.1 mmol/L    Chloride 103 95 - 110 mmol/L    CO2 20.0 (L) 22.0 - 31.0 mmol/L    Calcium 8.8 8.4 - 10.2 mg/dL    eGFR Non  Amer 126 77 - 179 mL/min/1.73    BUN/Creatinine Ratio 14.7 7.0 - 25.0    Anion Gap 16.0 (H) 5.0 - 15.0 mmol/L   Ethanol    Collection Time: 08/20/18 10:05 PM   Result Value Ref Range    Ethanol 140 (H) 0 - 10 mg/dL    Ethanol % 0.140 %   CBC Auto Differential    Collection Time: 08/20/18 10:05 PM   Result Value Ref Range    WBC 13.32 (H) 3.20 - 9.80 10*3/mm3    RBC 5.58 4.37 - 5.74 10*6/mm3    Hemoglobin 16.7 13.7 - 17.3 g/dL    Hematocrit 46.2 39.0 - 49.0 %    MCV 82.8 80.0 - 98.0 fL    MCH 29.9 26.5 - 34.0 pg    MCHC 36.1 31.5 - 36.3 g/dL    RDW 12.6 11.5 - 14.5 %    RDW-SD 37.9 35.1 - 43.9 fl    MPV 9.5 8.0 - 12.0 fL    Platelets 262 150 - 450 10*3/mm3    Neutrophil % 51.4 37.0 - 80.0 %    Lymphocyte % 31.8 10.0 - 50.0 %    Monocyte % 8.9 0.0 - 12.0 %    Eosinophil % 6.5 0.0 - 7.0 %    Basophil % 0.6 0.0 - 2.0 %    Immature Grans % 0.8 (H) 0.0 - 0.5 %    Neutrophils, Absolute 6.86 2.00 - 8.60 10*3/mm3    Lymphocytes, Absolute 4.23 (H) 0.60 - 4.20 10*3/mm3    Monocytes, Absolute 1.18 (H) 0.00 - " 0.90 10*3/mm3    Eosinophils, Absolute 0.86 (H) 0.00 - 0.70 10*3/mm3    Basophils, Absolute 0.08 0.00 - 0.20 10*3/mm3    Immature Grans, Absolute 0.11 (H) 0.00 - 0.02 10*3/mm3   Light Blue Top    Collection Time: 08/20/18 10:05 PM   Result Value Ref Range    Extra Tube hold for add-on    Green Top (Gel)    Collection Time: 08/20/18 10:05 PM   Result Value Ref Range    Extra Tube Hold for add-ons.    Lavender Top    Collection Time: 08/20/18 10:05 PM   Result Value Ref Range    Extra Tube hold for add-on    Urinalysis With Microscopic If Indicated (No Culture) - Urine, Clean Catch    Collection Time: 08/20/18 10:09 PM   Result Value Ref Range    Color, UA Yellow Yellow, Straw, Dark Yellow, [place]    Appearance, UA Clear Clear    pH, UA 6.0 5.0 - 9.0    Specific [place], UA 1.003 1.003 - 1.030    Glucose, UA Negative Negative    Ketones, UA Negative Negative    Bilirubin, UA Negative Negative    Blood, UA Negative Negative    Protein, UA Negative Negative    Leuk Esterase, UA Negative Negative    Nitrite, UA Negative Negative    Urobilinogen, UA 0.2 E.U./dL 0.2 - 1.0 E.U./dL   Urine Drug Screen - Urine, Clean Catch    Collection Time: 08/20/18 10:09 PM   Result Value Ref Range    Amphetamine Screen, Urine Negative Negative    Barbiturates Screen, Urine Negative Negative    Benzodiazepine Screen, Urine Negative Negative    Cocaine Screen, Urine Negative Negative    Methadone Screen, Urine Negative Negative    Opiate Screen Negative Negative    Oxycodone Screen, Urine Negative Negative    THC, Screen, Urine Negative Negative   Ethanol    Collection Time: 08/21/18 12:53 AM   Result Value Ref Range    Ethanol 93 (H) 0 - 10 mg/dL    Ethanol % 0.093 %   Ethanol    Collection Time: 08/21/18  3:15 AM   Result Value Ref Range    Ethanol 48 (H) 0 - 10 mg/dL    Ethanol % 0.048 %   Glucose, Fasting    Collection Time: 08/21/18  6:37 AM   Result Value Ref Range    Glucose, Fasting 105 60 - 110 mg/dL   Lipid Panel    Collection  Time: 08/21/18  6:37 AM   Result Value Ref Range    Total Cholesterol 208 (H) 0 - 199 mg/dL    Triglycerides 277 (H) 20 - 199 mg/dL    HDL Cholesterol 38 (L) 60 - 200 mg/dL    LDL Cholesterol  137 (H) 1 - 129 mg/dL    LDL/HDL Ratio 3.02 0.00 - 3.55   Lavender Top    Collection Time: 08/21/18  6:37 AM   Result Value Ref Range    Extra Tube hold for add-on    Comprehensive Metabolic Panel    Collection Time: 08/21/18  6:37 AM   Result Value Ref Range    Glucose 102 (H) 60 - 100 mg/dL    BUN 11 7 - 21 mg/dL    Creatinine 0.84 0.70 - 1.30 mg/dL    Sodium 140 137 - 145 mmol/L    Potassium 4.8 3.5 - 5.1 mmol/L    Chloride 104 95 - 110 mmol/L    CO2 23.0 22.0 - 31.0 mmol/L    Calcium 9.0 8.4 - 10.2 mg/dL    Total Protein 7.4 6.3 - 8.6 g/dL    Albumin 4.20 3.40 - 4.80 g/dL    ALT (SGPT) 171 (H) 21 - 72 U/L    AST (SGOT) 70 (H) 17 - 59 U/L    Alkaline Phosphatase 86 38 - 126 U/L    Total Bilirubin 0.6 0.2 - 1.3 mg/dL    eGFR Non  Amer 110 77 - 179 mL/min/1.73    Globulin 3.2 2.3 - 3.5 gm/dL    A/G Ratio 1.3 1.1 - 1.8 g/dL    BUN/Creatinine Ratio 13.1 7.0 - 25.0    Anion Gap 13.0 5.0 - 15.0 mmol/L   TSH+Free T4    Collection Time: 08/21/18 10:16 AM   Result Value Ref Range    TSH 5.980 (H) 0.460 - 4.680 mIU/mL    Free T4 1.08 0.78 - 2.19 ng/dL     No results found.      Summary of Hospital Course:    Patient was admitted to the behavioral health unit at [facility] to ensure patient safety.  Patient was provided treatment with the unit milieu, activities, therapies and psychopharmacological management.  Patient was placed on Q15 minute checks and Suicide.     Dr. Miller was consulted for management of medical co-morbidities.      Patient was restarted on the following psychiatric medications:   --Klonopin, change to 0.25mg BID PRN  --Zoloft 100mg daily for mood  --Remeron 30mg qHS for mood.      The following medication changes were made during the hospital stay:   --Seroquel titrated to 250mg qHS for  mood stability & bipolar depression   --Seroquel 12.5mg - 25mg BID PRN for anxiety.     --D/C Klonopin given long term negative effects    Patient had improvement over the course of the hospital stay and tolerated his medications.      Substance abuse issues were present.  EtOH use in binge fashion.  CIWA protocol performed but no Ativan needed.      Will recommend to outside MH Provider to consider further titration of Seroquel to 400mg for more optimal mood stability, as indicated and appropriate.      At time of interview, patient adamantly denies any thoughts of death or dying.  The patient also adamantly denies any suicidal ideations, intentions or plans.  Denies any homicidal ideations, intentions or plans.  Denies any auditory visual hallucinations.  Denies paranoia.  Not grossly psychotic.  The patient does not constitute an imminent risk of harm to self or others, at time of the interview.  Therefore, patient does not meet commitment criteria at this time.      Patients Condition at Discharge:  Patient is stable for discharge and is not an imminent threat to self or others.  The patient's behavrior was Appropriate.  Patient reported that mood was Euthymic.  Patient's affect was bright.  Patient's thought content was as follows:   Suicidal:  None   Homicidal:  None   Hallucinations:  None   Delusion:  None    Targeted PE  -MS: no tics/td. no cogwheeling or rigidity.  -HEENT: No Nystagmus or Opthalmoplegia.      AIMS: 0    Discharge Diagnosis:  Active Problems:    Bipolar 2 disorder, major depressive episode (CMS/[facility])    Nicotine use disorder      Discharge Medications:      Your medication list      START taking these medications      Instructions Last Dose Given Next Dose Due   QUEtiapine 50 MG tablet  Commonly known as:  SEROquel      Take 5 tablets by mouth Every Night.       QUEtiapine 25 MG tablet  Commonly known as:  SEROquel      Take 2 tablets by mouth 2 (Two) Times a Day As Needed (anxiety).        thiamine 100 MG tablet  Commonly known as:  VITAMIN B1      Take 1 tablet by mouth Daily.          CONTINUE taking these medications      Instructions Last Dose Given Next Dose Due   lamoTRIgine 25 MG tablet  Commonly known as:  LaMICtal      Take 25 mg by mouth Daily.       levothyroxine 25 MCG tablet  Commonly known as:  SYNTHROID, LEVOTHROID      Take 1 tablet by mouth Daily.       mirtazapine 30 MG tablet  Commonly known as:  REMERON      Take 30 mg by mouth Every Night.       sertraline 100 MG tablet  Commonly known as:  ZOLOFT      Take 100 mg by mouth Daily.       SUMAtriptan 50 MG tablet  Commonly known as:  IMITREX      Take one tablet at onset of headache. May repeat dose one time in 2 hours if headache not relieved.          STOP taking these medications    busPIRone 15 MG tablet  Commonly known as:  BUSPAR        clonazePAM 0.5 MG tablet  Commonly known as:  KlonoPIN        lurasidone 40 MG tablet tablet  Commonly known as:  LATUDA              Where to Get Your Medications      You can get these medications from any pharmacy    Bring a paper prescription for each of these medications  · QUEtiapine 25 MG tablet  · QUEtiapine 50 MG tablet  · thiamine 100 MG tablet         Justification for multiple antipsychotic medications at discharge:  Not Applicable.    Medication for smoking cessation: Patient declines prescriptions of any cessation agents.  Plans to stop himself    Medication for substance abuse: Patient declines prescriptions of any agents for the treatment of substance use disorders.  Denied any cravings    Disposition: Patient was discharged home with family.    Follow-up Information     [facility]. Go on 8/30/2018.    Why:  Arrive at 8am for appt    Take ID, Ins Card, SS Card, and Med Bottles to follow up appt    Call Crisis Hotline as needed at [phone]  Contact information:  [facility] [address] [place] [address] [phone]           Sam  Saman LUIS MD .    Specialty:  Family Medicine  Contact information:  [address]   [place] [address]  [phone]                   Psychiatric follow up will be with [facility].  Medical follow up will be with primary care physician.    Time Spent: More than 30 minutes.    Francisco Restrepo II, MD  08/24/18  5:23 PM

## 2019-02-13 PROBLEM — R51.9 ACUTE NONINTRACTABLE HEADACHE: Status: ACTIVE | Noted: 2019-02-13

## 2019-02-13 PROBLEM — R11.2 NAUSEA VOMITING AND DIARRHEA: Status: ACTIVE | Noted: 2019-02-13

## 2019-02-13 PROBLEM — R19.7 NAUSEA VOMITING AND DIARRHEA: Status: ACTIVE | Noted: 2019-02-13

## 2019-05-14 ENCOUNTER — OFFICE VISIT (OUTPATIENT)
Dept: FAMILY MEDICINE CLINIC | Facility: CLINIC | Age: 29
End: 2019-05-14

## 2019-05-14 ENCOUNTER — APPOINTMENT (OUTPATIENT)
Dept: LAB | Facility: HOSPITAL | Age: 29
End: 2019-05-14

## 2019-05-14 VITALS
WEIGHT: 274.13 LBS | HEART RATE: 107 BPM | SYSTOLIC BLOOD PRESSURE: 128 MMHG | OXYGEN SATURATION: 99 % | DIASTOLIC BLOOD PRESSURE: 86 MMHG | BODY MASS INDEX: 38.23 KG/M2

## 2019-05-14 DIAGNOSIS — Z00.00 ANNUAL PHYSICAL EXAM: Primary | ICD-10-CM

## 2019-05-14 DIAGNOSIS — E03.9 ACQUIRED HYPOTHYROIDISM: ICD-10-CM

## 2019-05-14 DIAGNOSIS — F31.81 BIPOLAR 2 DISORDER, MAJOR DEPRESSIVE EPISODE (HCC): ICD-10-CM

## 2019-05-14 DIAGNOSIS — R79.89 ELEVATED LFTS: ICD-10-CM

## 2019-05-14 DIAGNOSIS — G43.109 MIGRAINE WITH AURA AND WITHOUT STATUS MIGRAINOSUS, NOT INTRACTABLE: ICD-10-CM

## 2019-05-14 DIAGNOSIS — E66.9 CLASS 2 OBESITY WITH BODY MASS INDEX (BMI) OF 38.0 TO 38.9 IN ADULT, UNSPECIFIED OBESITY TYPE, UNSPECIFIED WHETHER SERIOUS COMORBIDITY PRESENT: ICD-10-CM

## 2019-05-14 DIAGNOSIS — Z23 ENCOUNTER FOR VACCINATION: ICD-10-CM

## 2019-05-14 PROCEDURE — 36415 COLL VENOUS BLD VENIPUNCTURE: CPT | Performed by: STUDENT IN AN ORGANIZED HEALTH CARE EDUCATION/TRAINING PROGRAM

## 2019-05-14 PROCEDURE — 83036 HEMOGLOBIN GLYCOSYLATED A1C: CPT | Performed by: STUDENT IN AN ORGANIZED HEALTH CARE EDUCATION/TRAINING PROGRAM

## 2019-05-14 PROCEDURE — 80061 LIPID PANEL: CPT | Performed by: STUDENT IN AN ORGANIZED HEALTH CARE EDUCATION/TRAINING PROGRAM

## 2019-05-14 PROCEDURE — 86706 HEP B SURFACE ANTIBODY: CPT | Performed by: STUDENT IN AN ORGANIZED HEALTH CARE EDUCATION/TRAINING PROGRAM

## 2019-05-14 PROCEDURE — 86803 HEPATITIS C AB TEST: CPT | Performed by: STUDENT IN AN ORGANIZED HEALTH CARE EDUCATION/TRAINING PROGRAM

## 2019-05-14 PROCEDURE — 90715 TDAP VACCINE 7 YRS/> IM: CPT | Performed by: STUDENT IN AN ORGANIZED HEALTH CARE EDUCATION/TRAINING PROGRAM

## 2019-05-14 PROCEDURE — 80053 COMPREHEN METABOLIC PANEL: CPT | Performed by: STUDENT IN AN ORGANIZED HEALTH CARE EDUCATION/TRAINING PROGRAM

## 2019-05-14 PROCEDURE — 99213 OFFICE O/P EST LOW 20 MIN: CPT | Performed by: STUDENT IN AN ORGANIZED HEALTH CARE EDUCATION/TRAINING PROGRAM

## 2019-05-14 PROCEDURE — 86708 HEPATITIS A ANTIBODY: CPT | Performed by: STUDENT IN AN ORGANIZED HEALTH CARE EDUCATION/TRAINING PROGRAM

## 2019-05-14 PROCEDURE — 84439 ASSAY OF FREE THYROXINE: CPT | Performed by: STUDENT IN AN ORGANIZED HEALTH CARE EDUCATION/TRAINING PROGRAM

## 2019-05-14 PROCEDURE — 86704 HEP B CORE ANTIBODY TOTAL: CPT | Performed by: STUDENT IN AN ORGANIZED HEALTH CARE EDUCATION/TRAINING PROGRAM

## 2019-05-14 PROCEDURE — 99406 BEHAV CHNG SMOKING 3-10 MIN: CPT | Performed by: STUDENT IN AN ORGANIZED HEALTH CARE EDUCATION/TRAINING PROGRAM

## 2019-05-14 PROCEDURE — 84443 ASSAY THYROID STIM HORMONE: CPT | Performed by: STUDENT IN AN ORGANIZED HEALTH CARE EDUCATION/TRAINING PROGRAM

## 2019-05-14 PROCEDURE — 90471 IMMUNIZATION ADMIN: CPT | Performed by: STUDENT IN AN ORGANIZED HEALTH CARE EDUCATION/TRAINING PROGRAM

## 2019-05-14 PROCEDURE — 87340 HEPATITIS B SURFACE AG IA: CPT | Performed by: STUDENT IN AN ORGANIZED HEALTH CARE EDUCATION/TRAINING PROGRAM

## 2019-05-14 RX ORDER — LAMOTRIGINE 200 MG/1
TABLET ORAL
Refills: 0 | COMMUNITY
Start: 2019-04-15 | End: 2022-12-18

## 2019-05-14 RX ORDER — DESVENLAFAXINE SUCCINATE 50 MG/1
50 TABLET, EXTENDED RELEASE ORAL DAILY
Start: 2019-05-14 | End: 2022-12-18

## 2019-05-14 RX ORDER — SUMATRIPTAN 50 MG/1
TABLET, FILM COATED ORAL
Qty: 5 TABLET | Refills: 3 | OUTPATIENT
Start: 2019-05-14 | End: 2022-12-18

## 2019-05-14 NOTE — PROGRESS NOTES
"                    Subjective   Ravi Reid is a 29 y.o. male, with PMHx of Bipolar 2 Disorder and tobacco dependence,  who presents for annual physical.    Patient was previously with Dr. Knox. He has not been seen in nearly a year. Last hospitalization was 8/21/2018-8/24/2018 for suicidal ideation. He recently completed alcohol rehabilitation at [facility] and reports he is 30 days sober from alcohol. While at [facility], lab work collected 4/8/2019 showed \"elevated liver levels and thyroid levels off.\"     He is currently being seen by Paige Bryan with Dr. Fleming, last seen 5/1/2019. We do not have recent notes from his treatment and MA requested notes from Paige Bryan but was told patient needs to sign release.    He additionally requests refill for Imitrex he has been getting for migraine headaches. He reports he is currently having 1-2 per month described as throbbing, unilateral, with photosensitivity that persist for several hours.       Past Medical Hx:  Past Medical History:   Diagnosis Date   • Acquired hypothyroidism    • Allergic rhinitis    • Bilateral conjunctivitis    • Bipolar 1 disorder (CMS/HCC)    • BOM (bilateral otitis media)    • General medical examination     General examination of patient - college physical      • Hypertensive disorder    • Insect bite     Insect bite - wound - History of possible spider bite.      • Kidney stones, calcium oxalate    • Upper respiratory infection        Past Surgical Hx:  Past Surgical History:   Procedure Laterality Date   • CYSTOSCOPY  11/14/2014     Right retrograde, ureteroscpoy, laser lithotripsy and J stent placement.    • KIDNEY STONE SURGERY         Health Maintenance:  Health Maintenance   Topic Date Due   • INFLUENZA VACCINE  08/01/2019   • ANNUAL PHYSICAL  05/15/2020   • TDAP/TD VACCINES (4 - Td) 05/14/2029   • PNEUMOCOCCAL VACCINE (19-64 MEDIUM RISK)  Addressed       Current Meds:    Current Outpatient " Medications:   •  desvenlafaxine (PRISTIQ) 50 MG 24 hr tablet, Take 1 tablet by mouth Daily., Disp: , Rfl:   •  lamoTRIgine (LaMICtal) 200 MG tablet, TAKE 1 TAB BY MOUTH NIGHTLY, Disp: , Rfl: 0  •  QUEtiapine XR (SEROquel XR) 400 MG 24 hr tablet, Take 400 mg by mouth Every Night., Disp: , Rfl:   •  SUMAtriptan (IMITREX) 50 MG tablet, Take one tablet at onset of headache. May repeat dose one time in 2 hours if headache not relieved., Disp: 5 tablet, Rfl: 3    Allergies:  Penicillins    Family Hx:  History reviewed. No pertinent family history.     Social History:  Social History     Socioeconomic History   • Marital status: Single     Spouse name: Not on file   • Number of children: Not on file   • Years of education: Not on file   • Highest education level: Not on file   Tobacco Use   • Smoking status: Current Some Day Smoker     Types: Electronic Cigarette   • Smokeless tobacco: Never Used   Substance and Sexual Activity   • Alcohol use: No     Alcohol/week: 2.4 oz     Types: 2 Cans of beer, 2 Shots of liquor per week     Frequency: Never     Comment: Recovering alcoholic   • Drug use: No   • Sexual activity: Yes     Birth control/protection: Condom       Review of Systems  Review of Systems   Constitutional: Negative for activity change, appetite change, chills, diaphoresis, fatigue and fever.   HENT: Negative for congestion, ear pain, postnasal drip, rhinorrhea, sore throat and trouble swallowing.    Eyes: Negative for pain and visual disturbance.   Respiratory: Negative for cough, chest tightness and shortness of breath.    Cardiovascular: Negative for chest pain, palpitations and leg swelling.   Gastrointestinal: Negative for abdominal distention, abdominal pain, constipation, diarrhea, nausea and vomiting.   Endocrine: Negative for polydipsia, polyphagia and polyuria.   Genitourinary: Negative for dysuria, flank pain, frequency and urgency.   Musculoskeletal: Negative for arthralgias, back pain and myalgias.    Skin: Negative for pallor and rash.   Neurological: Negative for dizziness, seizures, syncope, weakness and numbness.   Psychiatric/Behavioral: Negative for agitation, confusion, decreased concentration and dysphoric mood.            Objective:     /86   Pulse 107   Wt 124 kg (274 lb 2 oz)   SpO2 99%   BMI 38.23 kg/m²       Physical Exam   Constitutional: He is oriented to person, place, and time. He appears well-developed and well-nourished. No distress.   HENT:   Head: Normocephalic and atraumatic.   Right Ear: External ear normal.   Left Ear: External ear normal.   Nose: Nose normal.   Mouth/Throat: Oropharynx is clear and moist.   Eyes: EOM are normal. Pupils are equal, round, and reactive to light.   Neck: Normal range of motion. Neck supple. No thyromegaly present.   Cardiovascular: Normal rate, regular rhythm, normal heart sounds and intact distal pulses.   No murmur heard.  Pulmonary/Chest: Effort normal and breath sounds normal. He has no wheezes.   Abdominal: Soft. Bowel sounds are normal. He exhibits no distension. There is no tenderness. There is no guarding.   Musculoskeletal: Normal range of motion. He exhibits no edema, tenderness or deformity.   Lymphadenopathy:     He has no cervical adenopathy.   Neurological: He is alert and oriented to person, place, and time. He displays normal reflexes.   Skin: Skin is warm. Capillary refill takes less than 2 seconds.   Psychiatric: He has a normal mood and affect. His behavior is normal.       Assessment/Plan:     1. Annual physical exam    2. Elevated LFTs    3. Migraine with aura and without status migrainosus, not intractable    4. Class 2 obesity with body mass index (BMI) of 38.0 to 38.9 in adult, unspecified obesity type, unspecified whether serious comorbidity present    5. Bipolar 2 disorder, major depressive episode (CMS/HCC)    6. Encounter for vaccination    7. Acquired hypothyroidism       1. Performed. Lipid panel and Hgb A1c ordered  as patient on atypical antipsychotics.   2. By history, will order CMP and hepatitis panel as chart review shows this has been an ongoing lab abnormality. High index of suspicion for Hepatitis A in endemic area as well as potential for past surreptitious illicit drug use.   3. Stable, controlled. Refilled Imitrex  4. Exercise counseling given. Referral to nutritionist placed as patient is interested in speaking to dietician about healthy eating for weight loss.  5. Stable, controlled.   6. Tdap given  7. TSH and fT4    Follow-up:     Return in about 6 months (around 11/14/2019) for Recheck.    Goals        Patient Stated    • Other (pt-stated)      Feel better: Decrease depression  Barriers: Bipolar type 1.               Preventative:    Vaccines Recommended at this visit:   TDaP/TD    Vaccines Received at this visit:  TDaP/TD    Screenings Recommended at this visit:  Lipid Panel and Hgb A1c    Screenings Ordered at this visit:  Lipid Panel and Hgb A1c    Smoking Status:  Current smoker using electronic vape. Spoke for 7 minutes about continued risk with vaping nicotine or any substances into lung tissue. He agrees to cut back. Will re-evaluate at follow up    Alcohol Intake:  Recovering alcoholic    Patient's Body mass index is 38.23 kg/m². BMI is above normal parameters. Recommendations include: exercise counseling and referral to a nutritionist.         RISK SCORE: 4       Saman Vargas M.D. PGY1  [facility]  [address] [place] [address]  Office: [phone]    This document has been electronically signed by Saman Vargas MD on May 14, 2019 5:10 PM

## 2019-05-15 LAB
ALBUMIN SERPL-MCNC: 5 G/DL (ref 3.5–5.2)
ALBUMIN/GLOB SERPL: 2.1 G/DL
ALP SERPL-CCNC: 102 U/L (ref 39–117)
ALT SERPL W P-5'-P-CCNC: 132 U/L (ref 1–41)
ANION GAP SERPL CALCULATED.3IONS-SCNC: 13.8 MMOL/L
AST SERPL-CCNC: 45 U/L (ref 1–40)
BILIRUB SERPL-MCNC: 0.5 MG/DL (ref 0.2–1.2)
BUN BLD-MCNC: 10 MG/DL (ref 6–20)
BUN/CREAT SERPL: 9.7 (ref 7–25)
CALCIUM SPEC-SCNC: 9.4 MG/DL (ref 8.6–10.5)
CHLORIDE SERPL-SCNC: 102 MMOL/L (ref 98–107)
CHOLEST SERPL-MCNC: 193 MG/DL (ref 0–200)
CO2 SERPL-SCNC: 23.2 MMOL/L (ref 22–29)
CREAT BLD-MCNC: 1.03 MG/DL (ref 0.76–1.27)
GFR SERPL CREATININE-BSD FRML MDRD: 87 ML/MIN/1.73
GLOBULIN UR ELPH-MCNC: 2.4 GM/DL
GLUCOSE BLD-MCNC: 91 MG/DL (ref 65–99)
HAV AB SER QL IA: NEGATIVE
HBA1C MFR BLD: 5.5 % (ref 4.8–5.6)
HBV CORE AB SER DONR QL IA: NEGATIVE
HBV SURFACE AB SER RIA-ACNC: REACTIVE
HBV SURFACE AG SERPL QL IA: NORMAL
HCV AB SER DONR QL: NORMAL
HDLC SERPL-MCNC: 33 MG/DL (ref 40–60)
LDLC SERPL CALC-MCNC: 124 MG/DL (ref 0–100)
LDLC/HDLC SERPL: 3.76 {RATIO}
POTASSIUM BLD-SCNC: 4.2 MMOL/L (ref 3.5–5.2)
PROT SERPL-MCNC: 7.4 G/DL (ref 6–8.5)
SODIUM BLD-SCNC: 139 MMOL/L (ref 136–145)
T4 FREE SERPL-MCNC: 1.31 NG/DL (ref 0.93–1.7)
TRIGL SERPL-MCNC: 180 MG/DL (ref 0–150)
TSH SERPL DL<=0.05 MIU/L-ACNC: 4.74 MIU/ML (ref 0.27–4.2)
VLDLC SERPL-MCNC: 36 MG/DL (ref 5–40)

## 2019-05-28 NOTE — PROGRESS NOTES
I have seen this patient and discussed the case with resident and agree with the assessment and plan.  VIDYA Miller M.D.

## 2021-05-26 ENCOUNTER — TELEPHONE (OUTPATIENT)
Dept: FAMILY MEDICINE CLINIC | Facility: CLINIC | Age: 31
End: 2021-05-26

## 2021-05-26 NOTE — TELEPHONE ENCOUNTER
CALLED PATIENT TO SCHEDULE AN APT WITH OUR OFFICE FOR AN ANNUAL PHYSICAL. UNABLE TO REACH PATIENT, LEFT VM FOR PATIENT TO CALL BACK TO SCHEDULE.    THANKS,  PAT

## 2022-12-18 PROBLEM — E78.1 PURE HYPERTRIGLYCERIDEMIA: Status: ACTIVE | Noted: 2020-08-21

## 2022-12-18 PROBLEM — F31.31 BIPOLAR AFFECTIVE DISORDER, CURRENTLY DEPRESSED, MILD (HCC): Status: ACTIVE | Noted: 2020-08-21
